# Patient Record
Sex: FEMALE | Race: WHITE | NOT HISPANIC OR LATINO | Employment: UNEMPLOYED | URBAN - METROPOLITAN AREA
[De-identification: names, ages, dates, MRNs, and addresses within clinical notes are randomized per-mention and may not be internally consistent; named-entity substitution may affect disease eponyms.]

---

## 2015-04-23 LAB — PAP SMEAR - HIM PATIENT REPORTED: NORMAL

## 2017-01-06 ENCOUNTER — OFFICE VISIT - HEALTHEAST (OUTPATIENT)
Dept: FAMILY MEDICINE | Facility: CLINIC | Age: 47
End: 2017-01-06

## 2017-01-06 ENCOUNTER — HOSPITAL ENCOUNTER (OUTPATIENT)
Dept: CT IMAGING | Facility: CLINIC | Age: 47
Discharge: HOME | End: 2017-01-06
Attending: NURSE PRACTITIONER

## 2017-01-06 ENCOUNTER — COMMUNICATION - HEALTHEAST (OUTPATIENT)
Dept: SCHEDULING | Facility: CLINIC | Age: 47
End: 2017-01-06

## 2017-01-06 DIAGNOSIS — R10.9 ABDOMINAL PAIN: ICD-10-CM

## 2017-01-14 ENCOUNTER — COMMUNICATION - HEALTHEAST (OUTPATIENT)
Dept: SCHEDULING | Facility: CLINIC | Age: 47
End: 2017-01-14

## 2017-01-14 ENCOUNTER — COMMUNICATION - HEALTHEAST (OUTPATIENT)
Dept: FAMILY MEDICINE | Facility: CLINIC | Age: 47
End: 2017-01-14

## 2017-01-16 ENCOUNTER — COMMUNICATION - HEALTHEAST (OUTPATIENT)
Dept: FAMILY MEDICINE | Facility: CLINIC | Age: 47
End: 2017-01-16

## 2017-01-18 ENCOUNTER — COMMUNICATION - HEALTHEAST (OUTPATIENT)
Dept: FAMILY MEDICINE | Facility: CLINIC | Age: 47
End: 2017-01-18

## 2017-01-31 ENCOUNTER — COMMUNICATION - HEALTHEAST (OUTPATIENT)
Dept: FAMILY MEDICINE | Facility: CLINIC | Age: 47
End: 2017-01-31

## 2017-02-06 ENCOUNTER — COMMUNICATION - HEALTHEAST (OUTPATIENT)
Dept: FAMILY MEDICINE | Facility: CLINIC | Age: 47
End: 2017-02-06

## 2017-02-27 ENCOUNTER — HOSPITAL ENCOUNTER (OUTPATIENT)
Dept: MAMMOGRAPHY | Facility: CLINIC | Age: 47
Discharge: HOME | End: 2017-02-27
Attending: OBSTETRICS & GYNECOLOGY

## 2017-02-27 ENCOUNTER — COMMUNICATION - HEALTHEAST (OUTPATIENT)
Dept: FAMILY MEDICINE | Facility: CLINIC | Age: 47
End: 2017-02-27

## 2017-02-27 DIAGNOSIS — Z12.31 VISIT FOR SCREENING MAMMOGRAM: ICD-10-CM

## 2017-02-28 ENCOUNTER — AMBULATORY - HEALTHEAST (OUTPATIENT)
Dept: FAMILY MEDICINE | Facility: CLINIC | Age: 47
End: 2017-02-28

## 2017-02-28 DIAGNOSIS — J32.9 SINUSITIS: ICD-10-CM

## 2017-02-28 DIAGNOSIS — J30.9 ALLERGIC RHINITIS: ICD-10-CM

## 2017-03-07 ENCOUNTER — COMMUNICATION - HEALTHEAST (OUTPATIENT)
Dept: ALLERGY | Facility: CLINIC | Age: 47
End: 2017-03-07

## 2017-03-07 ENCOUNTER — OFFICE VISIT - HEALTHEAST (OUTPATIENT)
Dept: ALLERGY | Facility: CLINIC | Age: 47
End: 2017-03-07

## 2017-03-07 DIAGNOSIS — R09.81 NASAL CONGESTION: ICD-10-CM

## 2017-03-07 DIAGNOSIS — R51.9 FACIAL PAIN: ICD-10-CM

## 2017-03-07 ASSESSMENT — MIFFLIN-ST. JEOR: SCORE: 1071.85

## 2017-03-09 ENCOUNTER — OFFICE VISIT - HEALTHEAST (OUTPATIENT)
Dept: FAMILY MEDICINE | Facility: CLINIC | Age: 47
End: 2017-03-09

## 2017-03-09 DIAGNOSIS — M79.601 ARM PAIN, RIGHT: ICD-10-CM

## 2017-03-10 ENCOUNTER — RECORDS - HEALTHEAST (OUTPATIENT)
Dept: ADMINISTRATIVE | Facility: OTHER | Age: 47
End: 2017-03-10

## 2017-03-10 ENCOUNTER — AMBULATORY - HEALTHEAST (OUTPATIENT)
Dept: ALLERGY | Facility: CLINIC | Age: 47
End: 2017-03-10

## 2017-03-10 DIAGNOSIS — R51.9 HEADACHE: ICD-10-CM

## 2017-03-30 ENCOUNTER — OFFICE VISIT - HEALTHEAST (OUTPATIENT)
Dept: OTOLARYNGOLOGY | Facility: CLINIC | Age: 47
End: 2017-03-30

## 2017-03-30 DIAGNOSIS — K21.9 LARYNGOPHARYNGEAL REFLUX (LPR): ICD-10-CM

## 2017-03-30 DIAGNOSIS — M26.659 TMJ ARTHROPATHY: ICD-10-CM

## 2017-03-30 DIAGNOSIS — R09.82 PND (POST-NASAL DRIP): ICD-10-CM

## 2017-03-30 ASSESSMENT — MIFFLIN-ST. JEOR: SCORE: 1071.85

## 2017-04-05 ENCOUNTER — RECORDS - HEALTHEAST (OUTPATIENT)
Dept: ADMINISTRATIVE | Facility: OTHER | Age: 47
End: 2017-04-05

## 2017-04-23 ENCOUNTER — COMMUNICATION - HEALTHEAST (OUTPATIENT)
Dept: FAMILY MEDICINE | Facility: CLINIC | Age: 47
End: 2017-04-23

## 2017-04-23 DIAGNOSIS — F41.9 ANXIETY: ICD-10-CM

## 2017-05-15 ENCOUNTER — RECORDS - HEALTHEAST (OUTPATIENT)
Dept: ADMINISTRATIVE | Facility: OTHER | Age: 47
End: 2017-05-15

## 2017-07-12 ENCOUNTER — RECORDS - HEALTHEAST (OUTPATIENT)
Dept: GENERAL RADIOLOGY | Facility: CLINIC | Age: 47
End: 2017-07-12

## 2017-07-12 ENCOUNTER — OFFICE VISIT - HEALTHEAST (OUTPATIENT)
Dept: FAMILY MEDICINE | Facility: CLINIC | Age: 47
End: 2017-07-12

## 2017-07-12 DIAGNOSIS — M19.90 DJD (DEGENERATIVE JOINT DISEASE): ICD-10-CM

## 2017-07-12 DIAGNOSIS — M25.512 LEFT SHOULDER PAIN: ICD-10-CM

## 2017-07-12 DIAGNOSIS — M79.676 PAIN IN UNSPECIFIED TOE(S): ICD-10-CM

## 2017-07-12 DIAGNOSIS — K59.00 CONSTIPATION: ICD-10-CM

## 2017-07-26 ENCOUNTER — RECORDS - HEALTHEAST (OUTPATIENT)
Dept: ADMINISTRATIVE | Facility: OTHER | Age: 47
End: 2017-07-26

## 2017-08-30 ENCOUNTER — RECORDS - HEALTHEAST (OUTPATIENT)
Dept: ADMINISTRATIVE | Facility: OTHER | Age: 47
End: 2017-08-30

## 2017-10-04 ENCOUNTER — RECORDS - HEALTHEAST (OUTPATIENT)
Dept: ADMINISTRATIVE | Facility: OTHER | Age: 47
End: 2017-10-04

## 2017-10-06 ENCOUNTER — COMMUNICATION - HEALTHEAST (OUTPATIENT)
Dept: FAMILY MEDICINE | Facility: CLINIC | Age: 47
End: 2017-10-06

## 2017-11-30 ENCOUNTER — COMMUNICATION - HEALTHEAST (OUTPATIENT)
Dept: FAMILY MEDICINE | Facility: CLINIC | Age: 47
End: 2017-11-30

## 2017-11-30 DIAGNOSIS — M26.629 TMJ ARTHRALGIA: ICD-10-CM

## 2017-12-07 ENCOUNTER — COMMUNICATION - HEALTHEAST (OUTPATIENT)
Dept: FAMILY MEDICINE | Facility: CLINIC | Age: 47
End: 2017-12-07

## 2017-12-12 ENCOUNTER — AMBULATORY - HEALTHEAST (OUTPATIENT)
Dept: NURSING | Facility: CLINIC | Age: 47
End: 2017-12-12

## 2017-12-12 DIAGNOSIS — Z23 NEED FOR VACCINATION: ICD-10-CM

## 2018-01-23 ENCOUNTER — COMMUNICATION - HEALTHEAST (OUTPATIENT)
Dept: FAMILY MEDICINE | Facility: CLINIC | Age: 48
End: 2018-01-23

## 2018-01-23 DIAGNOSIS — F41.9 ANXIETY: ICD-10-CM

## 2018-03-14 ENCOUNTER — OFFICE VISIT - HEALTHEAST (OUTPATIENT)
Dept: FAMILY MEDICINE | Facility: CLINIC | Age: 48
End: 2018-03-14

## 2018-03-14 DIAGNOSIS — F41.9 ANXIETY: ICD-10-CM

## 2018-03-14 DIAGNOSIS — R53.83 FATIGUE: ICD-10-CM

## 2018-03-14 DIAGNOSIS — D64.9 ANEMIA: ICD-10-CM

## 2018-03-14 LAB
ERYTHROCYTE [DISTWIDTH] IN BLOOD BY AUTOMATED COUNT: 11.9 % (ref 11–14.5)
HCT VFR BLD AUTO: 35.4 % (ref 35–47)
HGB BLD-MCNC: 11.6 G/DL (ref 12–16)
MCH RBC QN AUTO: 27.8 PG (ref 27–34)
MCHC RBC AUTO-ENTMCNC: 32.7 G/DL (ref 32–36)
MCV RBC AUTO: 85 FL (ref 80–100)
PLATELET # BLD AUTO: 193 THOU/UL (ref 140–440)
PMV BLD AUTO: 7.3 FL (ref 7–10)
RBC # BLD AUTO: 4.15 MILL/UL (ref 3.8–5.4)
TSH SERPL DL<=0.005 MIU/L-ACNC: 0.91 UIU/ML (ref 0.3–5)
WBC: 4.7 THOU/UL (ref 4–11)

## 2018-03-14 ASSESSMENT — MIFFLIN-ST. JEOR: SCORE: 1063.68

## 2018-03-15 ENCOUNTER — COMMUNICATION - HEALTHEAST (OUTPATIENT)
Dept: FAMILY MEDICINE | Facility: CLINIC | Age: 48
End: 2018-03-15

## 2018-03-15 LAB
FERRITIN SERPL-MCNC: 7 NG/ML (ref 10–130)
IRON SATN MFR SERPL: 21 % (ref 20–50)
IRON SERPL-MCNC: 73 UG/DL (ref 42–175)
TIBC SERPL-MCNC: 340 UG/DL (ref 313–563)
TRANSFERRIN SERPL-MCNC: 272 MG/DL (ref 212–360)

## 2018-03-21 ENCOUNTER — HOSPITAL ENCOUNTER (OUTPATIENT)
Dept: MAMMOGRAPHY | Facility: CLINIC | Age: 48
Discharge: HOME | End: 2018-03-21
Attending: FAMILY MEDICINE

## 2018-03-21 DIAGNOSIS — Z12.31 VISIT FOR SCREENING MAMMOGRAM: ICD-10-CM

## 2018-04-23 ENCOUNTER — COMMUNICATION - HEALTHEAST (OUTPATIENT)
Dept: FAMILY MEDICINE | Facility: CLINIC | Age: 48
End: 2018-04-23

## 2018-04-27 ENCOUNTER — OFFICE VISIT - HEALTHEAST (OUTPATIENT)
Dept: FAMILY MEDICINE | Facility: CLINIC | Age: 48
End: 2018-04-27

## 2018-04-27 DIAGNOSIS — D50.9 IRON DEFICIENCY ANEMIA: ICD-10-CM

## 2018-04-27 DIAGNOSIS — Z01.818 ENCOUNTER FOR PREOPERATIVE EXAMINATION FOR GENERAL SURGICAL PROCEDURE: ICD-10-CM

## 2018-04-27 LAB
BASOPHILS # BLD AUTO: 0 THOU/UL (ref 0–0.2)
BASOPHILS NFR BLD AUTO: 0 % (ref 0–2)
EOSINOPHIL # BLD AUTO: 0.1 THOU/UL (ref 0–0.4)
EOSINOPHIL NFR BLD AUTO: 2 % (ref 0–6)
ERYTHROCYTE [DISTWIDTH] IN BLOOD BY AUTOMATED COUNT: 13 % (ref 11–14.5)
FERRITIN SERPL-MCNC: 10 NG/ML (ref 10–130)
HCT VFR BLD AUTO: 38.4 % (ref 35–47)
HGB BLD-MCNC: 12.9 G/DL (ref 12–16)
LYMPHOCYTES # BLD AUTO: 0.9 THOU/UL (ref 0.8–4.4)
LYMPHOCYTES NFR BLD AUTO: 23 % (ref 20–40)
MCH RBC QN AUTO: 29 PG (ref 27–34)
MCHC RBC AUTO-ENTMCNC: 33.7 G/DL (ref 32–36)
MCV RBC AUTO: 86 FL (ref 80–100)
MONOCYTES # BLD AUTO: 0.3 THOU/UL (ref 0–0.9)
MONOCYTES NFR BLD AUTO: 6 % (ref 2–10)
NEUTROPHILS # BLD AUTO: 2.7 THOU/UL (ref 2–7.7)
NEUTROPHILS NFR BLD AUTO: 68 % (ref 50–70)
PLATELET # BLD AUTO: 191 THOU/UL (ref 140–440)
PMV BLD AUTO: 7.8 FL (ref 7–10)
RBC # BLD AUTO: 4.46 MILL/UL (ref 3.8–5.4)
WBC: 4 THOU/UL (ref 4–11)

## 2018-04-27 ASSESSMENT — MIFFLIN-ST. JEOR: SCORE: 1058.24

## 2018-05-05 ENCOUNTER — COMMUNICATION - HEALTHEAST (OUTPATIENT)
Dept: SCHEDULING | Facility: CLINIC | Age: 48
End: 2018-05-05

## 2018-05-07 ENCOUNTER — COMMUNICATION - HEALTHEAST (OUTPATIENT)
Dept: FAMILY MEDICINE | Facility: CLINIC | Age: 48
End: 2018-05-07

## 2018-06-29 ENCOUNTER — COMMUNICATION - HEALTHEAST (OUTPATIENT)
Dept: FAMILY MEDICINE | Facility: CLINIC | Age: 48
End: 2018-06-29

## 2018-08-31 ENCOUNTER — COMMUNICATION - HEALTHEAST (OUTPATIENT)
Dept: FAMILY MEDICINE | Facility: CLINIC | Age: 48
End: 2018-08-31

## 2018-08-31 DIAGNOSIS — Z71.85 VACCINE COUNSELING: ICD-10-CM

## 2018-08-31 DIAGNOSIS — D50.9 ANEMIA, IRON DEFICIENCY: ICD-10-CM

## 2018-09-10 ENCOUNTER — COMMUNICATION - HEALTHEAST (OUTPATIENT)
Dept: FAMILY MEDICINE | Facility: CLINIC | Age: 48
End: 2018-09-10

## 2018-09-12 ENCOUNTER — COMMUNICATION - HEALTHEAST (OUTPATIENT)
Dept: FAMILY MEDICINE | Facility: CLINIC | Age: 48
End: 2018-09-12

## 2018-09-12 DIAGNOSIS — R30.0 DYSURIA: ICD-10-CM

## 2018-09-13 ENCOUNTER — AMBULATORY - HEALTHEAST (OUTPATIENT)
Dept: LAB | Facility: CLINIC | Age: 48
End: 2018-09-13

## 2018-09-13 DIAGNOSIS — R30.0 DYSURIA: ICD-10-CM

## 2018-09-13 LAB
ALBUMIN UR-MCNC: NEGATIVE MG/DL
APPEARANCE UR: CLEAR
BILIRUB UR QL STRIP: NEGATIVE
COLOR UR AUTO: YELLOW
GLUCOSE UR STRIP-MCNC: NEGATIVE MG/DL
HGB UR QL STRIP: NEGATIVE
KETONES UR STRIP-MCNC: NEGATIVE MG/DL
LEUKOCYTE ESTERASE UR QL STRIP: NEGATIVE
NITRATE UR QL: NEGATIVE
PH UR STRIP: 6.5 [PH] (ref 5–8)
SP GR UR STRIP: <=1.005 (ref 1–1.03)
UROBILINOGEN UR STRIP-ACNC: NORMAL

## 2018-09-17 ENCOUNTER — AMBULATORY - HEALTHEAST (OUTPATIENT)
Dept: LAB | Facility: CLINIC | Age: 48
End: 2018-09-17

## 2018-09-17 ENCOUNTER — AMBULATORY - HEALTHEAST (OUTPATIENT)
Dept: NURSING | Facility: CLINIC | Age: 48
End: 2018-09-17

## 2018-09-17 DIAGNOSIS — D50.9 ANEMIA, IRON DEFICIENCY: ICD-10-CM

## 2018-09-17 DIAGNOSIS — Z71.85 VACCINE COUNSELING: ICD-10-CM

## 2018-09-17 LAB
ERYTHROCYTE [DISTWIDTH] IN BLOOD BY AUTOMATED COUNT: 11.1 % (ref 11–14.5)
FERRITIN SERPL-MCNC: 16 NG/ML (ref 10–130)
HCT VFR BLD AUTO: 37.3 % (ref 35–47)
HGB BLD-MCNC: 12.8 G/DL (ref 12–16)
MCH RBC QN AUTO: 29.2 PG (ref 27–34)
MCHC RBC AUTO-ENTMCNC: 34.2 G/DL (ref 32–36)
MCV RBC AUTO: 85 FL (ref 80–100)
PLATELET # BLD AUTO: 209 THOU/UL (ref 140–440)
PMV BLD AUTO: 7.8 FL (ref 7–10)
RBC # BLD AUTO: 4.37 MILL/UL (ref 3.8–5.4)
WBC: 3.6 THOU/UL (ref 4–11)

## 2018-09-18 ENCOUNTER — COMMUNICATION - HEALTHEAST (OUTPATIENT)
Dept: FAMILY MEDICINE | Facility: CLINIC | Age: 48
End: 2018-09-18

## 2018-09-18 LAB — HBV SURFACE AB SERPL IA-ACNC: POSITIVE M[IU]/ML

## 2018-09-24 ENCOUNTER — COMMUNICATION - HEALTHEAST (OUTPATIENT)
Dept: FAMILY MEDICINE | Facility: CLINIC | Age: 48
End: 2018-09-24

## 2018-09-25 ENCOUNTER — OFFICE VISIT - HEALTHEAST (OUTPATIENT)
Dept: FAMILY MEDICINE | Facility: CLINIC | Age: 48
End: 2018-09-25

## 2018-09-25 DIAGNOSIS — F41.1 GENERALIZED ANXIETY DISORDER: ICD-10-CM

## 2018-09-25 DIAGNOSIS — N89.8 VAGINAL IRRITATION: ICD-10-CM

## 2018-09-25 DIAGNOSIS — Z79.899 MEDICATION MANAGEMENT: ICD-10-CM

## 2018-09-25 LAB
ATRIAL RATE - MUSE: 86 BPM
CLUE CELLS: NORMAL
DIASTOLIC BLOOD PRESSURE - MUSE: NORMAL MMHG
INTERPRETATION ECG - MUSE: NORMAL
P AXIS - MUSE: 47 DEGREES
PR INTERVAL - MUSE: 140 MS
QRS DURATION - MUSE: 76 MS
QT - MUSE: 360 MS
QTC - MUSE: 430 MS
R AXIS - MUSE: 21 DEGREES
SYSTOLIC BLOOD PRESSURE - MUSE: NORMAL MMHG
T AXIS - MUSE: 37 DEGREES
TRICHOMONAS, WET PREP: NORMAL
VENTRICULAR RATE- MUSE: 86 BPM
YEAST, WET PREP: NORMAL

## 2018-10-12 ENCOUNTER — COMMUNICATION - HEALTHEAST (OUTPATIENT)
Dept: FAMILY MEDICINE | Facility: CLINIC | Age: 48
End: 2018-10-12

## 2018-10-22 ENCOUNTER — OFFICE VISIT - HEALTHEAST (OUTPATIENT)
Dept: FAMILY MEDICINE | Facility: CLINIC | Age: 48
End: 2018-10-22

## 2018-10-22 DIAGNOSIS — G47.00 INSOMNIA: ICD-10-CM

## 2018-10-22 DIAGNOSIS — F41.1 GENERALIZED ANXIETY DISORDER: ICD-10-CM

## 2018-10-22 DIAGNOSIS — Z79.899 MEDICATION MANAGEMENT: ICD-10-CM

## 2018-10-22 LAB — TSH SERPL DL<=0.005 MIU/L-ACNC: 0.91 UIU/ML (ref 0.3–5)

## 2018-11-03 ENCOUNTER — COMMUNICATION - HEALTHEAST (OUTPATIENT)
Dept: FAMILY MEDICINE | Facility: CLINIC | Age: 48
End: 2018-11-03

## 2018-11-03 DIAGNOSIS — F41.1 GENERALIZED ANXIETY DISORDER: ICD-10-CM

## 2018-11-19 ENCOUNTER — RECORDS - HEALTHEAST (OUTPATIENT)
Dept: ADMINISTRATIVE | Facility: OTHER | Age: 48
End: 2018-11-19

## 2018-12-09 ENCOUNTER — COMMUNICATION - HEALTHEAST (OUTPATIENT)
Dept: FAMILY MEDICINE | Facility: CLINIC | Age: 48
End: 2018-12-09

## 2018-12-27 ENCOUNTER — TRANSFERRED RECORDS (OUTPATIENT)
Dept: HEALTH INFORMATION MANAGEMENT | Facility: CLINIC | Age: 48
End: 2018-12-27

## 2018-12-27 ENCOUNTER — RECORDS - HEALTHEAST (OUTPATIENT)
Dept: ADMINISTRATIVE | Facility: OTHER | Age: 48
End: 2018-12-27

## 2019-01-28 ENCOUNTER — COMMUNICATION - HEALTHEAST (OUTPATIENT)
Dept: FAMILY MEDICINE | Facility: CLINIC | Age: 49
End: 2019-01-28

## 2019-01-28 DIAGNOSIS — M19.049 ARTHRITIS OF HAND: ICD-10-CM

## 2019-01-28 DIAGNOSIS — D72.819 LEUKOPENIA, UNSPECIFIED TYPE: ICD-10-CM

## 2019-02-18 ENCOUNTER — RECORDS - HEALTHEAST (OUTPATIENT)
Dept: ADMINISTRATIVE | Facility: OTHER | Age: 49
End: 2019-02-18

## 2019-02-18 ENCOUNTER — TRANSFERRED RECORDS (OUTPATIENT)
Dept: HEALTH INFORMATION MANAGEMENT | Facility: CLINIC | Age: 49
End: 2019-02-18

## 2019-02-22 ENCOUNTER — RECORDS - HEALTHEAST (OUTPATIENT)
Dept: ADMINISTRATIVE | Facility: OTHER | Age: 49
End: 2019-02-22

## 2019-02-22 LAB — HPV_EXT - HISTORICAL: ABNORMAL

## 2019-03-20 ENCOUNTER — RECORDS - HEALTHEAST (OUTPATIENT)
Dept: ADMINISTRATIVE | Facility: OTHER | Age: 49
End: 2019-03-20

## 2019-04-08 ENCOUNTER — OFFICE VISIT - HEALTHEAST (OUTPATIENT)
Dept: FAMILY MEDICINE | Facility: CLINIC | Age: 49
End: 2019-04-08

## 2019-04-08 DIAGNOSIS — H93.13 TINNITUS OF BOTH EARS: ICD-10-CM

## 2019-04-08 ASSESSMENT — MIFFLIN-ST. JEOR: SCORE: 990.76

## 2019-04-08 NOTE — ASSESSMENT & PLAN NOTE
Diagnosis.  Unclear etiology.  Broad differential was considered.  There is no pulsatile quality.  No other neurologic symptoms to suggest central lesion.  She does have symptoms consistent with eustachian tube dysfunction.  She also has a history of TMJ.  She is also been using NSAIDs intermittently.  We discussed each of these conditions.  Given her occupation as a  however, I suspect that her tinnitus is the result of sensorineural hearing loss.  She will work to minimize the effect of the above conditions.  I referred her to audiology and will consider a referral to otolaryngology in the future if her symptoms continue.

## 2019-04-23 ENCOUNTER — RECORDS - HEALTHEAST (OUTPATIENT)
Dept: ADMINISTRATIVE | Facility: OTHER | Age: 49
End: 2019-04-23

## 2019-04-26 ENCOUNTER — HOSPITAL ENCOUNTER (OUTPATIENT)
Dept: MAMMOGRAPHY | Facility: CLINIC | Age: 49
Discharge: HOME | End: 2019-04-26
Attending: OBSTETRICS & GYNECOLOGY

## 2019-04-26 DIAGNOSIS — Z12.31 VISIT FOR SCREENING MAMMOGRAM: ICD-10-CM

## 2019-05-01 ENCOUNTER — OFFICE VISIT - HEALTHEAST (OUTPATIENT)
Dept: AUDIOLOGY | Facility: CLINIC | Age: 49
End: 2019-05-01

## 2019-05-01 ENCOUNTER — OFFICE VISIT - HEALTHEAST (OUTPATIENT)
Dept: OTOLARYNGOLOGY | Facility: CLINIC | Age: 49
End: 2019-05-01

## 2019-05-01 DIAGNOSIS — H93.13 TINNITUS OF BOTH EARS: ICD-10-CM

## 2019-05-01 DIAGNOSIS — H93.8X3 PLUGGED FEELING IN EAR, BILATERAL: ICD-10-CM

## 2019-05-03 ENCOUNTER — TRANSFERRED RECORDS (OUTPATIENT)
Dept: HEALTH INFORMATION MANAGEMENT | Facility: CLINIC | Age: 49
End: 2019-05-03

## 2019-05-14 ENCOUNTER — COMMUNICATION - HEALTHEAST (OUTPATIENT)
Dept: FAMILY MEDICINE | Facility: CLINIC | Age: 49
End: 2019-05-14

## 2019-05-16 ENCOUNTER — COMMUNICATION - HEALTHEAST (OUTPATIENT)
Dept: FAMILY MEDICINE | Facility: CLINIC | Age: 49
End: 2019-05-16

## 2019-05-17 ENCOUNTER — OFFICE VISIT - HEALTHEAST (OUTPATIENT)
Dept: FAMILY MEDICINE | Facility: CLINIC | Age: 49
End: 2019-05-17

## 2019-05-17 DIAGNOSIS — R20.8 DYSESTHESIA: ICD-10-CM

## 2019-05-17 ASSESSMENT — MIFFLIN-ST. JEOR: SCORE: 990.76

## 2019-07-30 ENCOUNTER — RECORDS - HEALTHEAST (OUTPATIENT)
Dept: ADMINISTRATIVE | Facility: OTHER | Age: 49
End: 2019-07-30

## 2019-07-30 ENCOUNTER — TRANSFERRED RECORDS (OUTPATIENT)
Dept: HEALTH INFORMATION MANAGEMENT | Facility: CLINIC | Age: 49
End: 2019-07-30

## 2019-08-13 ENCOUNTER — TRANSFERRED RECORDS (OUTPATIENT)
Dept: HEALTH INFORMATION MANAGEMENT | Facility: CLINIC | Age: 49
End: 2019-08-13

## 2019-08-13 ENCOUNTER — RECORDS - HEALTHEAST (OUTPATIENT)
Dept: ADMINISTRATIVE | Facility: OTHER | Age: 49
End: 2019-08-13

## 2019-09-09 ENCOUNTER — RECORDS - HEALTHEAST (OUTPATIENT)
Dept: ADMINISTRATIVE | Facility: OTHER | Age: 49
End: 2019-09-09

## 2019-09-09 LAB — HPV_EXT - HISTORICAL: ABNORMAL

## 2019-10-02 ENCOUNTER — COMMUNICATION - HEALTHEAST (OUTPATIENT)
Dept: FAMILY MEDICINE | Facility: CLINIC | Age: 49
End: 2019-10-02

## 2019-10-09 ENCOUNTER — TRANSFERRED RECORDS (OUTPATIENT)
Dept: HEALTH INFORMATION MANAGEMENT | Facility: CLINIC | Age: 49
End: 2019-10-09

## 2019-10-09 ENCOUNTER — OFFICE VISIT - HEALTHEAST (OUTPATIENT)
Dept: FAMILY MEDICINE | Facility: CLINIC | Age: 49
End: 2019-10-09

## 2019-10-09 DIAGNOSIS — F41.1 GENERALIZED ANXIETY DISORDER: ICD-10-CM

## 2019-10-09 DIAGNOSIS — K59.01 SLOW TRANSIT CONSTIPATION: ICD-10-CM

## 2019-10-09 DIAGNOSIS — R14.0 BLOATING: ICD-10-CM

## 2019-10-09 DIAGNOSIS — Z00.00 ROUTINE GENERAL MEDICAL EXAMINATION AT A HEALTH CARE FACILITY: ICD-10-CM

## 2019-10-09 ASSESSMENT — ANXIETY QUESTIONNAIRES
5. BEING SO RESTLESS THAT IT IS HARD TO SIT STILL: SEVERAL DAYS
4. TROUBLE RELAXING: SEVERAL DAYS
GAD7 TOTAL SCORE: 5
7. FEELING AFRAID AS IF SOMETHING AWFUL MIGHT HAPPEN: NOT AT ALL
3. WORRYING TOO MUCH ABOUT DIFFERENT THINGS: SEVERAL DAYS
6. BECOMING EASILY ANNOYED OR IRRITABLE: SEVERAL DAYS
IF YOU CHECKED OFF ANY PROBLEMS ON THIS QUESTIONNAIRE, HOW DIFFICULT HAVE THESE PROBLEMS MADE IT FOR YOU TO DO YOUR WORK, TAKE CARE OF THINGS AT HOME, OR GET ALONG WITH OTHER PEOPLE: SOMEWHAT DIFFICULT
2. NOT BEING ABLE TO STOP OR CONTROL WORRYING: NOT AT ALL
1. FEELING NERVOUS, ANXIOUS, OR ON EDGE: SEVERAL DAYS

## 2019-10-09 ASSESSMENT — MIFFLIN-ST. JEOR: SCORE: 988.78

## 2019-10-10 LAB
CHOLEST SERPL-MCNC: 155 MG/DL
FASTING STATUS PATIENT QL REPORTED: NORMAL
HDLC SERPL-MCNC: 88 MG/DL
LDLC SERPL CALC-MCNC: 59 MG/DL
TRIGL SERPL-MCNC: 39 MG/DL
TSH SERPL DL<=0.005 MIU/L-ACNC: 1.06 UIU/ML (ref 0.3–5)

## 2019-10-14 ENCOUNTER — TRANSFERRED RECORDS (OUTPATIENT)
Dept: HEALTH INFORMATION MANAGEMENT | Facility: CLINIC | Age: 49
End: 2019-10-14

## 2019-10-14 ENCOUNTER — MEDICAL CORRESPONDENCE (OUTPATIENT)
Dept: HEALTH INFORMATION MANAGEMENT | Facility: CLINIC | Age: 49
End: 2019-10-14

## 2019-10-17 ENCOUNTER — RECORDS - HEALTHEAST (OUTPATIENT)
Dept: HEALTH INFORMATION MANAGEMENT | Facility: CLINIC | Age: 49
End: 2019-10-17

## 2019-10-21 ENCOUNTER — COMMUNICATION - HEALTHEAST (OUTPATIENT)
Dept: FAMILY MEDICINE | Facility: CLINIC | Age: 49
End: 2019-10-21

## 2019-10-21 ENCOUNTER — RECORDS - HEALTHEAST (OUTPATIENT)
Dept: HEALTH INFORMATION MANAGEMENT | Facility: CLINIC | Age: 49
End: 2019-10-21

## 2019-10-21 DIAGNOSIS — F41.1 GENERALIZED ANXIETY DISORDER: ICD-10-CM

## 2019-10-22 ENCOUNTER — COMMUNICATION - HEALTHEAST (OUTPATIENT)
Dept: FAMILY MEDICINE | Facility: CLINIC | Age: 49
End: 2019-10-22

## 2019-10-23 NOTE — TELEPHONE ENCOUNTER
RECORDS RECEIVED FROM: [facility]    DATE RECEIVED: 2/3/20    NOTES STATUS DETAILS   OFFICE NOTE from referring provider Received HE recs scanned in [facility]    OFFICE NOTE from other specialist Received HE recs scanned in epic   [facility] recs scanned in epic   Fax to [facility] to obtain outside recs - 10/23    DISCHARGE SUMMARY from hospital N/A    OPERATIVE REPORT N/A    MEDICATION LIST N/A         ENDOSCOPY  N/A    COLONOSCOPY Care Everywhere 2/18/19   ERCP N/A    EUS N/A    STOOL TESTING N/A    PERTINENT LABS Internal    PATHOLOGY REPORTS (RELATED) N/A    IMAGING (CT, MRI, EGD) Received  Images in  PACS  XR Abd 5/3/19, 5/1/19      Fax to  to push images - 10/23      REFERRAL INFORMATION    Date referral was placed: 2/3/20   Date all records received: N/A   Date records were scanned into Epic: N/A   Date records were sent to Provider to review: N/A   Date and recommendation received from provider:  LETTER SENT  SCHEDULE APPOINTMENT   Date patient was contacted to schedule: 10/18/19

## 2019-11-04 ENCOUNTER — COMMUNICATION - HEALTHEAST (OUTPATIENT)
Dept: FAMILY MEDICINE | Facility: CLINIC | Age: 49
End: 2019-11-04

## 2019-11-04 DIAGNOSIS — F41.1 GENERALIZED ANXIETY DISORDER: ICD-10-CM

## 2019-11-22 ENCOUNTER — COMMUNICATION - HEALTHEAST (OUTPATIENT)
Dept: FAMILY MEDICINE | Facility: CLINIC | Age: 49
End: 2019-11-22

## 2019-12-09 ENCOUNTER — COMMUNICATION - HEALTHEAST (OUTPATIENT)
Dept: FAMILY MEDICINE | Facility: CLINIC | Age: 49
End: 2019-12-09

## 2019-12-09 DIAGNOSIS — F41.1 GENERALIZED ANXIETY DISORDER: ICD-10-CM

## 2019-12-12 ENCOUNTER — RECORDS - HEALTHEAST (OUTPATIENT)
Dept: GENERAL RADIOLOGY | Facility: CLINIC | Age: 49
End: 2019-12-12

## 2019-12-12 ENCOUNTER — OFFICE VISIT - HEALTHEAST (OUTPATIENT)
Dept: FAMILY MEDICINE | Facility: CLINIC | Age: 49
End: 2019-12-12

## 2019-12-12 ENCOUNTER — COMMUNICATION - HEALTHEAST (OUTPATIENT)
Dept: FAMILY MEDICINE | Facility: CLINIC | Age: 49
End: 2019-12-12

## 2019-12-12 DIAGNOSIS — M53.3 PAIN IN THE COCCYX: ICD-10-CM

## 2019-12-12 DIAGNOSIS — F41.1 GENERALIZED ANXIETY DISORDER: ICD-10-CM

## 2019-12-12 DIAGNOSIS — J01.10 ACUTE NON-RECURRENT FRONTAL SINUSITIS: ICD-10-CM

## 2019-12-12 DIAGNOSIS — M53.3 SACROCOCCYGEAL DISORDERS, NOT ELSEWHERE CLASSIFIED: ICD-10-CM

## 2019-12-12 ASSESSMENT — ANXIETY QUESTIONNAIRES
7. FEELING AFRAID AS IF SOMETHING AWFUL MIGHT HAPPEN: NOT AT ALL
IF YOU CHECKED OFF ANY PROBLEMS ON THIS QUESTIONNAIRE, HOW DIFFICULT HAVE THESE PROBLEMS MADE IT FOR YOU TO DO YOUR WORK, TAKE CARE OF THINGS AT HOME, OR GET ALONG WITH OTHER PEOPLE: NOT DIFFICULT AT ALL
2. NOT BEING ABLE TO STOP OR CONTROL WORRYING: NOT AT ALL
5. BEING SO RESTLESS THAT IT IS HARD TO SIT STILL: NOT AT ALL
GAD7 TOTAL SCORE: 1
3. WORRYING TOO MUCH ABOUT DIFFERENT THINGS: NOT AT ALL
1. FEELING NERVOUS, ANXIOUS, OR ON EDGE: NOT AT ALL
4. TROUBLE RELAXING: NOT AT ALL
6. BECOMING EASILY ANNOYED OR IRRITABLE: SEVERAL DAYS

## 2020-01-30 ENCOUNTER — TELEPHONE (OUTPATIENT)
Dept: GASTROENTEROLOGY | Facility: CLINIC | Age: 50
End: 2020-01-30

## 2020-01-30 NOTE — TELEPHONE ENCOUNTER
Called and left message for patient reminding of appointment scheduled on 2/3/20 at 220pm with [facility] GI clinic. Patient to arrive 15 min early. To reschedule or cancel patient to call [phone].    GABY Dobbs

## 2020-02-03 ENCOUNTER — RECORDS - HEALTHEAST (OUTPATIENT)
Dept: ADMINISTRATIVE | Facility: OTHER | Age: 50
End: 2020-02-03

## 2020-02-03 ENCOUNTER — PRE VISIT (OUTPATIENT)
Dept: GASTROENTEROLOGY | Facility: CLINIC | Age: 50
End: 2020-02-03

## 2020-02-03 ENCOUNTER — OFFICE VISIT (OUTPATIENT)
Dept: GASTROENTEROLOGY | Facility: CLINIC | Age: 50
End: 2020-02-03
Payer: COMMERCIAL

## 2020-02-03 VITALS
BODY MASS INDEX: 20.62 KG/M2 | SYSTOLIC BLOOD PRESSURE: 136 MMHG | HEART RATE: 99 BPM | HEIGHT: 60 IN | WEIGHT: 105 LBS | OXYGEN SATURATION: 99 % | DIASTOLIC BLOOD PRESSURE: 76 MMHG

## 2020-02-03 DIAGNOSIS — K59.01 SLOW TRANSIT CONSTIPATION: Primary | ICD-10-CM

## 2020-02-03 RX ORDER — TRAZODONE HYDROCHLORIDE 50 MG/1
TABLET, FILM COATED ORAL
COMMUNITY
Start: 2020-02-03 | End: 2021-07-15

## 2020-02-03 RX ORDER — CITALOPRAM HYDROBROMIDE 40 MG/1
TABLET ORAL
COMMUNITY
Start: 2019-09-23 | End: 2020-02-03

## 2020-02-03 RX ORDER — PRUCALOPRIDE 1 MG/1
TABLET, FILM COATED ORAL
COMMUNITY
Start: 2019-09-30 | End: 2020-02-03

## 2020-02-03 RX ORDER — BIOTIN 5 MG
TABLET ORAL
COMMUNITY
Start: 2019-06-03

## 2020-02-03 RX ORDER — MUPIROCIN 20 MG/G
OINTMENT TOPICAL
COMMUNITY
Start: 2019-05-06 | End: 2020-02-03

## 2020-02-03 RX ORDER — PLECANATIDE 3 MG/1
TABLET ORAL
COMMUNITY
Start: 2019-09-23 | End: 2020-02-03

## 2020-02-03 RX ORDER — SENNOSIDES 8.6 MG
1 TABLET ORAL DAILY
Qty: 90 TABLET | Refills: 3 | Status: SHIPPED | OUTPATIENT
Start: 2020-02-03 | End: 2021-07-29

## 2020-02-03 RX ORDER — LINACLOTIDE 72 UG/1
CAPSULE, GELATIN COATED ORAL
COMMUNITY
Start: 2019-04-14 | End: 2020-02-03

## 2020-02-03 RX ORDER — POLYETHYLENE GLYCOL 3350 17 G/17G
1 POWDER, FOR SOLUTION ORAL DAILY
Qty: 507 G | Refills: 11 | Status: SHIPPED | OUTPATIENT
Start: 2020-02-03 | End: 2022-10-12

## 2020-02-03 RX ORDER — POLYETHYLENE GLYCOL 3350 17 G/17G
238 POWDER, FOR SOLUTION ORAL ONCE
Qty: 238 G | Refills: 1 | Status: SHIPPED | OUTPATIENT
Start: 2020-02-03 | End: 2020-02-03

## 2020-02-03 RX ORDER — VENLAFAXINE HYDROCHLORIDE 75 MG/1
CAPSULE, EXTENDED RELEASE ORAL
COMMUNITY
Start: 2020-01-08 | End: 2022-02-07

## 2020-02-03 ASSESSMENT — ENCOUNTER SYMPTOMS
HEARTBURN: 0
BACK PAIN: 0
DIARRHEA: 0
BLOATING: 1
JOINT SWELLING: 1
BRUISES/BLEEDS EASILY: 1
NECK PAIN: 0
CONSTIPATION: 1
ABDOMINAL PAIN: 0
ARTHRALGIAS: 1
MYALGIAS: 0
VOMITING: 0
NAUSEA: 0
MUSCLE CRAMPS: 0
SWOLLEN GLANDS: 0

## 2020-02-03 ASSESSMENT — MIFFLIN-ST. JEOR: SCORE: 1017.78

## 2020-02-03 ASSESSMENT — PAIN SCALES - GENERAL: PAINLEVEL: MILD PAIN (2)

## 2020-02-03 NOTE — PROGRESS NOTES
GI CLINIC VISIT    CC/REFERRING MD:  Surekha Contreras    REASON FOR CONSULTATION:   The pt is a 49 year old female who I was asked to see in consultation at the request of Dr. Surekha Contreras for constipation.    ASSESSMENT/PLAN:  49-year-old woman with depression and slow transit constipation who presents for management of constipation.    Work-up thus far reassuringly normal and suggestive of slow transit constipation.  Sitzmarks are demonstrated retained markers at day 5 (although mostly in distal colon).  Pelvic floor evaluation was essentially unremarkable.  Electrolytes previously normal and symptoms have not changed since prior lab evaluation.    Reassuringly, she had too strong of the response to laxatives/secretagogues and these were needed to be stopped due to side effects.  She will likely benefit from a more moderate approach to constipation, although it may take some tailoring.    -MiraLAX colonoscopy prep to clean out  -Prunes for breakfast  -MiraLAX 17 g daily  -Senna once per day  -Magnesium oxide twice per day  -If this causes too much diarrhea, first decrease magnesium oxide, then decrease senna  -If still constipated, may be beneficial to start Linzess 72mcg QOD   -May be beneficial to try squatty potty, although again pelvic floor evaluation was normal    RTC PRN-she will call if additional appointment is desired.  Okay to see myself or OLEG Mcrae.    Thank you for this consultation.  It was a pleasure to participate in the care of this patient; please contact us with any further questions.      Elmer Douglass MD    Division of Gastroenterology, Hepatology and Nutrition  [facility]      HPI  49-year-old woman with depression and slow transit constipation who presents for management of constipation.    She ties the onset of constipation to surgery in 2006 for umbilical hernia and torn rectus muscle.  Since then, she has had a bowel movement  approximately once per week.  She needs to strain to defecate and will often sit on the toilet for 15 minutes.  However, the stool is usually soft.  She will have some nausea and cramping when defecating.  She notes bloating and gas from many different kinds of food.  She feels like she completely empties her rectum.  No blood or melena.  Lost 15 pounds on purpose 1.5 years ago, since then weights been stable.  No family history of colorectal cancer.  Currently, she takes senna once per day and massages her abdomen in the morning-this is approximately the best she is felt and is having a bowel movement once every 5 days.    She is tried a variety of different medications in the past:    Miralax daily and senna- not helpful and caused lots of gas.   Amitiza caused rash on legs.  Trulance and Motegrity caused more bloating and gas, then would vomit and get shaky when defecating.  She would have a bowel movement every 2 to 3 days, however on those days would have 3-4 bowel movements per day associated with urgency and sometimes leakage.  She had an episode of nocturnal defecation and so decided to stop these medications.  Linzess caused diarrhea and fecal incontinence, even at lower doses.     Colonoscopy was normal 1 year ago.  She felt much better after the colonoscopy prep.    Pelvic floor center evaluation essentially normal including EMG (equivocal) and defecography (small rectocele which completely emptied). 3 vaginal deliveries with tearing during all of deliveries-now children are age 23, 20 and 15.    Non smoker. Little alcohol once or twice a month. No other drugs or opiates. Venlafaxine started ~5 months ago. Takes trazodone nightly.     ROS:    No fevers or chills  No weight loss  No blurry vision, double vision or change in vision  No sore throat  No lymphadenopathy  No headache, paraesthesias, or weakness in a limb  No shortness of breath or wheezing  No chest pain or pressure  No arthralgias or  "myalgias  No rashes or skin changes  No odynophagia or dysphagia  No BRBPR, hematochezia, melena  No dysuria, frequency or urgency  No hot/cold intolerance or polyria  No anxiety or depression    PROBLEM LIST  Constipation    PERTINENT PAST MEDICAL HISTORY:  Constipation, depression    PREVIOUS SURGERIES:  Umbilical hernia repair and torn rectus muscle repair    PREVIOUS ENDOSCOPY:  Colonoscopy at age 39 and 49 normal    ALLERGIES:   Allergies not on file    PERTINENT MEDICATIONS:    Current Outpatient Medications:      Biotin (SUPER BIOTIN) 5 MG TABS, , Disp: , Rfl:      Cholecalciferol 100 MCG (4000 UT) CAPS, Take 2,000 mg by mouth, Disp: , Rfl:      magnesium oxide (MAG-OX) 400 (241.3 Mg) MG tablet, Take 1 tablet (400 mg) by mouth 2 times daily, Disp: 60 tablet, Rfl: 11     polyethylene glycol (MIRALAX) packet, Take 238 g by mouth once for 1 dose One 8.3-ounce bottle (238 g).  Refer to \"Getting Ready for a Colonoscopy\" patient handout, Disp: 238 g, Rfl: 1     polyethylene glycol (MIRALAX/GLYCOLAX) powder, Take 17 g (1 capful) by mouth daily, Disp: 507 g, Rfl: 11     SENNA CO, Take 2 tablets by mouth, Disp: , Rfl:      sennosides (SENOKOT) 8.6 MG tablet, Take 1 tablet by mouth daily, Disp: 90 tablet, Rfl: 3     traZODone (DESYREL) 50 MG tablet, , Disp: , Rfl:      venlafaxine (EFFEXOR-XR) 75 MG 24 hr capsule, , Disp: , Rfl:     SOCIAL HISTORY:  Social History     Socioeconomic History     Marital status:      Spouse name: Not on file     Number of children: Not on file     Years of education: Not on file     Highest education level: Not on file   Occupational History     Not on file   Social Needs     Financial resource strain: Not on file     Food insecurity:     Worry: Not on file     Inability: Not on file     Transportation needs:     Medical: Not on file     Non-medical: Not on file   Tobacco Use     Smoking status: Not on file   Substance and Sexual Activity     Alcohol use: Not on file     Drug use: " Not on file     Sexual activity: Not on file   Lifestyle     Physical activity:     Days per week: Not on file     Minutes per session: Not on file     Stress: Not on file   Relationships     Social connections:     Talks on phone: Not on file     Gets together: Not on file     Attends Restorationism service: Not on file     Active member of club or organization: Not on file     Attends meetings of clubs or organizations: Not on file     Relationship status: Not on file     Intimate partner violence:     Fear of current or ex partner: Not on file     Emotionally abused: Not on file     Physically abused: Not on file     Forced sexual activity: Not on file   Other Topics Concern     Not on file   Social History Narrative     Not on file   Works as a     FAMILY HISTORY:  No family history of colorectal cancer.  Past/family/social history reviewed and no changes    PHYSICAL EXAMINATION:  Constitutional: aaox3, cooperative, pleasant, not dyspneic/diaphoretic, no acute distress  Vitals reviewed: /76 (BP Location: Left arm, Patient Position: Chair, Cuff Size: Adult Regular)   Pulse 99   Ht 1.524 m (5')   Wt 47.6 kg (105 lb)   SpO2 99%   BMI 20.51 kg/m    Wt:   Wt Readings from Last 2 Encounters:   02/03/20 47.6 kg (105 lb)   Eyes: Sclera anicteric/injected  Ears/nose/mouth/throat: Normal oropharynx without ulcers or exudate, mucus membranes moist, hearing intact  Neck: supple, thyroid normal size  CV: No edema  Respiratory: Unlabored breathing  Lymph: No cervical lymphadenopathy  Abd: Nondistended, +bs, no hepatosplenomegaly, nontender, no peritoneal signs  Skin: warm, perfused, no jaundice  Psych: Normal affect  MSK: Normal gait    PERTINENT STUDIES:  TSH 10/9/2019 normal.    Abdominal x-ray-day 5 of the sits marker exam.  There are 7 of 24 residual sits markers (positive study) there is a large volume of stool in the colon.    Abdominal x-ray day 1 of sits marker exam-24 markers scattered  throughout the bowel most within the region of the colon predominantly in the descending colon.  Moderate amount of stool.  Nothing for obstruction or free air.    Answers for HPI/ROS submitted by the patient on 2/3/2020   General Symptoms: No  Skin Symptoms: No  HENT Symptoms: No  EYE SYMPTOMS: No  HEART SYMPTOMS: No  LUNG SYMPTOMS: No  INTESTINAL SYMPTOMS: Yes  URINARY SYMPTOMS: No  GYNECOLOGIC SYMPTOMS: No  BREAST SYMPTOMS: No  SKELETAL SYMPTOMS: Yes  BLOOD SYMPTOMS: Yes  NERVOUS SYSTEM SYMPTOMS: No  MENTAL HEALTH SYMPTOMS: No  Heart burn or indigestion: No  Nausea: No  Vomiting: No  Abdominal pain: No  Bloating: Yes  Constipation: Yes  Diarrhea: No  Back pain: No  Muscle aches: No  Neck pain: No  Swollen joints: Yes  Joint pain: Yes  Bone pain: No  Muscle cramps: No  Anemia: No  Swollen glands: No  Easy bleeding or bruising: Yes  Edema or swelling: Yes

## 2020-02-03 NOTE — LETTER
2/3/2020       RE: Keena Vee  [address]   [facility] [address]     Dear Colleague,    Thank you for referring your patient, Keena Vee, to the [facility] GASTROENTEROLOGY AND IBD CLINIC at [facility]. Please see a copy of my visit note below.    GI CLINIC VISIT    CC/REFERRING MD:  Surekha Contreras    REASON FOR CONSULTATION:   The pt is a 49 year old female who I was asked to see in consultation at the request of Dr. Surekha Contreras for constipation.    ASSESSMENT/PLAN:  49-year-old woman with depression and slow transit constipation who presents for management of constipation.    Work-up thus far reassuringly normal and suggestive of slow transit constipation.  Sitzmarks are demonstrated retained markers at day 5 (although mostly in distal colon).  Pelvic floor evaluation was essentially unremarkable.  Electrolytes previously normal and symptoms have not changed since prior lab evaluation.    Reassuringly, she had too strong of the response to laxatives/secretagogues and these were needed to be stopped due to side effects.  She will likely benefit from a more moderate approach to constipation, although it may take some tailoring.    -MiraLAX colonoscopy prep to clean out  -Prunes for breakfast  -MiraLAX 17 g daily  -Senna once per day  -Magnesium oxide twice per day  -If this causes too much diarrhea, first decrease magnesium oxide, then decrease senna  -If still constipated, may be beneficial to start Linzess 72mcg QOD   -May be beneficial to try squatty potty, although again pelvic floor evaluation was normal    RTC PRN-she will call if additional appointment is desired.  Okay to see myself or OLEG Mcrae.    Thank you for this consultation.  It was a pleasure to participate in the care of this patient; please contact us with any further questions.      Elmer Douglass MD    Division of Gastroenterology, Hepatology and  [facility]      HPI  49-year-old woman with depression and slow transit constipation who presents for management of constipation.    She ties the onset of constipation to surgery in 2006 for umbilical hernia and torn rectus muscle.  Since then, she has had a bowel movement approximately once per week.  She needs to strain to defecate and will often sit on the toilet for 15 minutes.  However, the stool is usually soft.  She will have some nausea and cramping when defecating.  She notes bloating and gas from many different kinds of food.  She feels like she completely empties her rectum.  No blood or melena.  Lost 15 pounds on purpose 1.5 years ago, since then weights been stable.  No family history of colorectal cancer.  Currently, she takes senna once per day and massages her abdomen in the morning-this is approximately the best she is felt and is having a bowel movement once every 5 days.    She is tried a variety of different medications in the past:    Miralax daily and senna- not helpful and caused lots of gas.   Amitiza caused rash on legs.  Trulance and Motegrity caused more bloating and gas, then would vomit and get shaky when defecating.  She would have a bowel movement every 2 to 3 days, however on those days would have 3-4 bowel movements per day associated with urgency and sometimes leakage.  She had an episode of nocturnal defecation and so decided to stop these medications.  Linzess caused diarrhea and fecal incontinence, even at lower doses.     Colonoscopy was normal 1 year ago.  She felt much better after the colonoscopy prep.    Pelvic floor center evaluation essentially normal including EMG (equivocal) and defecography (small rectocele which completely emptied). 3 vaginal deliveries with tearing during all of deliveries-now children are age 23, 20 and 15.    Non smoker. Little alcohol once or twice a month. No other drugs or opiates. Venlafaxine started ~5 months ago. Takes  "trazodone nightly.     ROS:    No fevers or chills  No weight loss  No blurry vision, double vision or change in vision  No sore throat  No lymphadenopathy  No headache, paraesthesias, or weakness in a limb  No shortness of breath or wheezing  No chest pain or pressure  No arthralgias or myalgias  No rashes or skin changes  No odynophagia or dysphagia  No BRBPR, hematochezia, melena  No dysuria, frequency or urgency  No hot/cold intolerance or polyria  No anxiety or depression    PROBLEM LIST  Constipation    PERTINENT PAST MEDICAL HISTORY:  Constipation, depression    PREVIOUS SURGERIES:  Umbilical hernia repair and torn rectus muscle repair    PREVIOUS ENDOSCOPY:  Colonoscopy at age 39 and 49 normal    ALLERGIES:   Allergies not on file    PERTINENT MEDICATIONS:    Current Outpatient Medications:      Biotin (SUPER BIOTIN) 5 MG TABS, , Disp: , Rfl:      Cholecalciferol 100 MCG (4000 UT) CAPS, Take 2,000 mg by mouth, Disp: , Rfl:      magnesium oxide (MAG-OX) 400 (241.3 Mg) MG tablet, Take 1 tablet (400 mg) by mouth 2 times daily, Disp: 60 tablet, Rfl: 11     polyethylene glycol (MIRALAX) packet, Take 238 g by mouth once for 1 dose One 8.3-ounce bottle (238 g).  Refer to \"Getting Ready for a Colonoscopy\" patient handout, Disp: 238 g, Rfl: 1     polyethylene glycol (MIRALAX/GLYCOLAX) powder, Take 17 g (1 capful) by mouth daily, Disp: 507 g, Rfl: 11     SENNA CO, Take 2 tablets by mouth, Disp: , Rfl:      sennosides (SENOKOT) 8.6 MG tablet, Take 1 tablet by mouth daily, Disp: 90 tablet, Rfl: 3     traZODone (DESYREL) 50 MG tablet, , Disp: , Rfl:      venlafaxine (EFFEXOR-XR) 75 MG 24 hr capsule, , Disp: , Rfl:     SOCIAL HISTORY:  Social History     Socioeconomic History     Marital status:      Spouse name: Not on file     Number of children: Not on file     Years of education: Not on file     Highest education level: Not on file   Occupational History     Not on file   Social Needs     Financial resource " strain: Not on file     Food insecurity:     Worry: Not on file     Inability: Not on file     Transportation needs:     Medical: Not on file     Non-medical: Not on file   Tobacco Use     Smoking status: Not on file   Substance and Sexual Activity     Alcohol use: Not on file     Drug use: Not on file     Sexual activity: Not on file   Lifestyle     Physical activity:     Days per week: Not on file     Minutes per session: Not on file     Stress: Not on file   Relationships     Social connections:     Talks on phone: Not on file     Gets together: Not on file     Attends Spiritism service: Not on file     Active member of club or organization: Not on file     Attends meetings of clubs or organizations: Not on file     Relationship status: Not on file     Intimate partner violence:     Fear of current or ex partner: Not on file     Emotionally abused: Not on file     Physically abused: Not on file     Forced sexual activity: Not on file   Other Topics Concern     Not on file   Social History Narrative     Not on file   Works as a     FAMILY HISTORY:  No family history of colorectal cancer.  Past/family/social history reviewed and no changes    PHYSICAL EXAMINATION:  Constitutional: aaox3, cooperative, pleasant, not dyspneic/diaphoretic, no acute distress  Vitals reviewed: /76 (BP Location: Left arm, Patient Position: Chair, Cuff Size: Adult Regular)   Pulse 99   Ht 1.524 m (5')   Wt 47.6 kg (105 lb)   SpO2 99%   BMI 20.51 kg/m     Wt:   Wt Readings from Last 2 Encounters:   02/03/20 47.6 kg (105 lb)   Eyes: Sclera anicteric/injected  Ears/nose/mouth/throat: Normal oropharynx without ulcers or exudate, mucus membranes moist, hearing intact  Neck: supple, thyroid normal size  CV: No edema  Respiratory: Unlabored breathing  Lymph: No cervical lymphadenopathy  Abd: Nondistended, +bs, no hepatosplenomegaly, nontender, no peritoneal signs  Skin: warm, perfused, no jaundice  Psych: Normal  affect  MSK: Normal gait    PERTINENT STUDIES:  TSH 10/9/2019 normal.    Abdominal x-ray-day 5 of the sits marker exam.  There are 7 of 24 residual sits markers (positive study) there is a large volume of stool in the colon.    Abdominal x-ray day 1 of sits marker exam-24 markers scattered throughout the bowel most within the region of the colon predominantly in the descending colon.  Moderate amount of stool.  Nothing for obstruction or free air.    Again, thank you for allowing me to participate in the care of your patient.      Sincerely,    Elmer Douglass MD

## 2020-02-03 NOTE — PATIENT INSTRUCTIONS
It was a pleasure taking care of you today.  I've included a brief summary of our discussion and care plan from today's visit below.  Please review this information with your primary care provider.  _______________________________________________________________________    My recommendations are summarized as follows:  1. Take Miralax colonoscopy prep on a Saturday or Sunday when you are free.   2. After the colonoscopy prep, start Miralax 17g once per day, Senna once per day, and magnesium oxide twice per day.   3. Eat prunes (5 to 10) for breakfast.   4. If you are having too much diarrhea, first decrease the magnesium oxide to once per day. If still having diarrhea, stop Senna.   5. Call us with an update in 1 month. We can set up an appointment at that time if needed.   6. If no BM for 4 days, it is ok to take one dose of magnesium citrate.   7. If this isn't helpful, we can try stronger medications (like Motegrity or Linzess) at smaller doses or every other day.    Please call our nurse Kristin with any questions or concerns- [phone].  --    Return to GI Clinic as needed to review your progress.    _______________________________________________________________________    Who do I call with any questions after my visit?  Please be in touch if there are any further questions that arise following today's visit.  There are multiple ways to contact your gastroenterology care team.        During business hours, you may reach a Gastroenterology nurse at [phone] and choose option 3.         To schedule or reschedule an appointment, please call [phone].       You can always send a secure message through [employer].  [employer] messages are answered by your nurse or doctor typically within 24 hours.  Please allow extra time on weekends and holidays.        For urgent/emergent questions after business hours, you may reach the on-call GI Fellow by contacting the [facility]  at [phone].      How will I get the results of any tests ordered?    You will receive all of your results.  If you have signed up for [employer], any tests ordered at your visit will be available to you after your physician reviews them.  Typically this takes 1-2 weeks.  If there are urgent results that require a change in your care plan, your physician or nurse will call you to discuss the next steps.      What is [employer]?  [employer] is a secure way for you to access all of your healthcare records from the [facility].  It is a web based computer program, so you can sign on to it from any location.  It also allows you to send secure messages to your care team.  I recommend signing up for [employer] access if you have not already done so and are comfortable with using a computer.      How to I schedule a follow-up visit?  If you did not schedule a follow-up visit today, please call [phone] to schedule a follow-up office visit.        Sincerely,    Elmer Douglass MD     [facility]  Division of Gastroenterology

## 2020-02-03 NOTE — NURSING NOTE
Chief Complaint   Patient presents with     Consult     consult for constipation/bloating/eructation       Vitals:    02/03/20 1432   BP: 136/76   BP Location: Left arm   Patient Position: Chair   Cuff Size: Adult Regular   Pulse: 99   SpO2: 99%   Weight: 47.6 kg (105 lb)   Height: 1.524 m (5')       Body mass index is 20.51 kg/m .    aMciej Mcallister, EMT

## 2020-02-07 ENCOUNTER — PATIENT OUTREACH (OUTPATIENT)
Dept: GASTROENTEROLOGY | Facility: CLINIC | Age: 50
End: 2020-02-07

## 2020-02-20 ENCOUNTER — RECORDS - HEALTHEAST (OUTPATIENT)
Dept: ADMINISTRATIVE | Facility: OTHER | Age: 50
End: 2020-02-20

## 2020-02-25 ENCOUNTER — COMMUNICATION - HEALTHEAST (OUTPATIENT)
Dept: FAMILY MEDICINE | Facility: CLINIC | Age: 50
End: 2020-02-25

## 2020-02-25 DIAGNOSIS — F41.1 GENERALIZED ANXIETY DISORDER: ICD-10-CM

## 2020-03-02 ENCOUNTER — COMMUNICATION - HEALTHEAST (OUTPATIENT)
Dept: FAMILY MEDICINE | Facility: CLINIC | Age: 50
End: 2020-03-02

## 2020-03-18 ENCOUNTER — COMMUNICATION - HEALTHEAST (OUTPATIENT)
Dept: FAMILY MEDICINE | Facility: CLINIC | Age: 50
End: 2020-03-18

## 2020-03-18 DIAGNOSIS — F41.1 GENERALIZED ANXIETY DISORDER: ICD-10-CM

## 2020-04-28 ENCOUNTER — COMMUNICATION - HEALTHEAST (OUTPATIENT)
Dept: FAMILY MEDICINE | Facility: CLINIC | Age: 50
End: 2020-04-28

## 2020-04-28 DIAGNOSIS — F41.1 GENERALIZED ANXIETY DISORDER: ICD-10-CM

## 2020-04-29 ENCOUNTER — COMMUNICATION - HEALTHEAST (OUTPATIENT)
Dept: FAMILY MEDICINE | Facility: CLINIC | Age: 50
End: 2020-04-29

## 2020-04-29 DIAGNOSIS — F41.1 GENERALIZED ANXIETY DISORDER: ICD-10-CM

## 2020-05-18 ENCOUNTER — COMMUNICATION - HEALTHEAST (OUTPATIENT)
Dept: FAMILY MEDICINE | Facility: CLINIC | Age: 50
End: 2020-05-18

## 2020-05-18 DIAGNOSIS — F41.1 GENERALIZED ANXIETY DISORDER: ICD-10-CM

## 2020-05-22 ENCOUNTER — COMMUNICATION - HEALTHEAST (OUTPATIENT)
Dept: SCHEDULING | Facility: CLINIC | Age: 50
End: 2020-05-22

## 2020-06-11 ENCOUNTER — OFFICE VISIT - HEALTHEAST (OUTPATIENT)
Dept: FAMILY MEDICINE | Facility: CLINIC | Age: 50
End: 2020-06-11

## 2020-06-11 DIAGNOSIS — F41.1 GENERALIZED ANXIETY DISORDER: ICD-10-CM

## 2020-06-11 ASSESSMENT — ANXIETY QUESTIONNAIRES
3. WORRYING TOO MUCH ABOUT DIFFERENT THINGS: NOT AT ALL
4. TROUBLE RELAXING: NOT AT ALL
GAD7 TOTAL SCORE: 0
7. FEELING AFRAID AS IF SOMETHING AWFUL MIGHT HAPPEN: NOT AT ALL
1. FEELING NERVOUS, ANXIOUS, OR ON EDGE: NOT AT ALL
5. BEING SO RESTLESS THAT IT IS HARD TO SIT STILL: NOT AT ALL
2. NOT BEING ABLE TO STOP OR CONTROL WORRYING: NOT AT ALL
6. BECOMING EASILY ANNOYED OR IRRITABLE: NOT AT ALL
IF YOU CHECKED OFF ANY PROBLEMS ON THIS QUESTIONNAIRE, HOW DIFFICULT HAVE THESE PROBLEMS MADE IT FOR YOU TO DO YOUR WORK, TAKE CARE OF THINGS AT HOME, OR GET ALONG WITH OTHER PEOPLE: NOT DIFFICULT AT ALL

## 2020-06-11 NOTE — ASSESSMENT & PLAN NOTE
The patient is currently on 112.5 mg dosage and is doing well.  I refilled the patient's 37.5 mg capsule and provided her with a 90-day supply.

## 2020-06-24 ENCOUNTER — AMBULATORY - HEALTHEAST (OUTPATIENT)
Dept: NURSING | Facility: CLINIC | Age: 50
End: 2020-06-24

## 2020-06-25 ENCOUNTER — COMMUNICATION - HEALTHEAST (OUTPATIENT)
Dept: FAMILY MEDICINE | Facility: CLINIC | Age: 50
End: 2020-06-25

## 2020-07-06 ENCOUNTER — HOSPITAL ENCOUNTER (OUTPATIENT)
Dept: MAMMOGRAPHY | Facility: CLINIC | Age: 50
Discharge: HOME | End: 2020-07-06
Attending: OBSTETRICS & GYNECOLOGY

## 2020-07-06 DIAGNOSIS — Z12.31 VISIT FOR SCREENING MAMMOGRAM: ICD-10-CM

## 2020-07-09 ENCOUNTER — VIRTUAL VISIT (OUTPATIENT)
Dept: FAMILY MEDICINE | Facility: OTHER | Age: 50
End: 2020-07-09

## 2020-07-09 ENCOUNTER — AMBULATORY - HEALTHEAST (OUTPATIENT)
Dept: FAMILY MEDICINE | Facility: CLINIC | Age: 50
End: 2020-07-09

## 2020-07-09 DIAGNOSIS — Z20.822 SUSPECTED COVID-19 VIRUS INFECTION: ICD-10-CM

## 2020-07-09 NOTE — PROGRESS NOTES
"Date: 2020 07:46:31  Clinician: Teressa Viramontes  Clinician NPI: [id number]  Patient: Keena Vee  Patient : 1970  Patient Address: [address] [place] [address]  Patient Phone: [phone]  Visit Protocol: URI  Patient Summary:  Keena is a 50 year old ( : 1970 ) female who initiated a Visit for COVID-19 (Coronavirus) evaluation and screening. When asked the question \"Please sign me up to receive news, health information and promotions from [employer].\", Keena responded \"No\".    Keena states her symptoms started 1-2 days ago.   Her symptoms consist of a headache, a sore throat, facial pain or pressure, and nasal congestion.   Symptom details     Nasal secretions: The color of her mucus is clear.    Sore throat: Keena reports having mild throat pain (1-3 on a 10 point pain scale), does not have exudate on her tonsils, and can swallow liquids. She is not sure if the lymph nodes in her neck are enlarged. A rash has not appeared on the skin since the sore throat started.     Facial pain or pressure: The facial pain or pressure does not feel worse when bending or leaning forward.     Headache: She states the headache is moderate (4-6 on a 10 point pain scale).      Keena denies having wheezing, nausea, teeth pain, ageusia, diarrhea, vomiting, rhinitis, malaise, ear pain, chills, myalgias, anosmia, fever, and cough. She also denies having recent facial or sinus surgery in the past 60 days and taking antibiotic medication in the past month. She is not experiencing dyspnea.   Precipitating events  Within the past week, Keena has not been exposed to someone with strep throat.   Pertinent COVID-19 (Coronavirus) information  In the past 14 days, Keena has not worked in a congregate living setting.   She does not work or volunteer as healthcare worker or a  and does not work or volunteer in a healthcare facility.   Keena also has not lived in a congregate living setting in " the past 14 days. She does not live with a healthcare worker.   Keena has not had a close contact with a laboratory-confirmed COVID-19 patient within 14 days of symptom onset.   Pertinent medical history  Keena does not get yeast infections when she takes antibiotics.   Keena does not need a return to work/school note.   Weight: 100 lbs   Keena does not smoke or use smokeless tobacco.   Weight: 100 lbs    MEDICATIONS: calcium carbonate-calcium citrate-vitamin D3 oral, biotin-calcium carbonate oral, vitamin B complex-vitamin C-folic acid oral, magnesium oxide-magnesium amino acid chelate oral, Allegra-D 24 Hour oral, venlafaxine oral, ALLERGIES: NKDA  Clinician Response:  Dear Keena,   Your symptoms show that you may have coronavirus (COVID-19). This illness can cause fever, cough and trouble breathing. Many people get a mild case and get better on their own. Some people can get very sick.  What should I do?  We would like to test you for this virus.   1. Please call [phone] to schedule your visit. Explain that you were referred by [facility] to have a COVID-19 test. Be ready to share your [facility] visit ID number.  The following will serve as your written order for this COVID Test, ordered by me, for the indication of suspected COVID [Z20.828]: The test will be ordered in jigl, our electronic health record, after you are scheduled. It will show as ordered and authorized by Alex Maza MD.  Order: COVID-19 (Coronavirus) PCR for SYMPTOMATIC testing from [facility].      2. When it's time for your COVID test:  Stay at least 6 feet away from others. (If someone will drive you to your test, stay in the backseat, as far away from the  as you can.)   Cover your mouth and nose with a mask, tissue or washcloth.  Go straight to the testing site. Don't make any stops on the way there or back.      3.Starting now: Stay home and away from others (self-isolate) until:   You've had no fever---and no medicine that reduces  "fever---for 3 full days (72 hours). And...   Your other symptoms have gotten better. For example, your cough or breathing has improved. And...   At least 10 days have passed since your symptoms started.       During this time, don't leave the house except for testing or medical care.   Stay in your own room, even for meals. Use your own bathroom if you can.   Stay away from others in your home. No hugging, kissing or shaking hands. No visitors.  Don't go to work, school or anywhere else.    Clean \"high touch\" surfaces often (doorknobs, counters, handles, etc.). Use a household cleaning spray or wipes. You'll find a full list of  on the EPA website: www.epa.gov/pesticide-registration/list-n-disinfectants-use-against-sars-cov-2.   Cover your mouth and nose with a mask, tissue or washcloth to avoid spreading germs.  Wash your hands and face often. Use soap and water.  Caregivers in these groups are at risk for severe illness due to COVID-19:  o People 65 years and older  o People who live in a nursing home or long-term care facility  o People with chronic disease (lung, heart, cancer, diabetes, kidney, liver, immunologic)  o People who have a weakened immune system, including those who:   Are in cancer treatment  Take medicine that weakens the immune system, such as corticosteroids  Had a bone marrow or organ transplant  Have an immune deficiency  Have poorly controlled HIV or AIDS  Are obese (body mass index of 40 or higher)  Smoke regularly   o Caregivers should wear gloves while washing dishes, handling laundry and cleaning bedrooms and bathrooms.  o Use caution when washing and drying laundry: Don't shake dirty laundry, and use the warmest water setting that you can.  o For more tips, go to www.cdc.gov/coronavirus/2019-ncov/downloads/10Things.pdf.    4.Sign up for [employer]. We know it's scary to hear that you might have COVID-19. We want to track your symptoms to make sure you're okay over the next 2 " weeks. Please look for an email from [employer]---this is a free, online program that we'll use to keep in touch. To sign up, follow the link in the email. Learn more at [email or web address]  How can I take care of myself?   Get lots of rest. Drink extra fluids (unless a doctor has told you not to).   Take Tylenol (acetaminophen) for fever or pain. If you have liver or kidney problems, ask your family doctor if it's okay to take Tylenol.   Adults can take either:    650 mg (two 325 mg pills) every 4 to 6 hours, or...   1,000 mg (two 500 mg pills) every 8 hours as needed.    Note: Don't take more than 3,000 mg in one day. Acetaminophen is found in many medicines (both prescribed and over-the-counter medicines). Read all labels to be sure you don't take too much.   For children, check the Tylenol bottle for the right dose. The dose is based on the child's age or weight.    If you have other health problems (like cancer, heart failure, an organ transplant or severe kidney disease): Call your specialty clinic if you don't feel better in the next 2 days.       Know when to call 911. Emergency warning signs include:    Trouble breathing or shortness of breath Pain or pressure in the chest that doesn't go away Feeling confused like you haven't felt before, or not being able to wake up Bluish-colored lips or face.  Where can I get more information?   [facility] -- About COVID-19: [email or web address]   CDC -- What to Do If You're Sick: www.cdc.gov/coronavirus/2019-ncov/about/steps-when-sick.html   CDC -- Ending Home Isolation: www.cdc.gov/coronavirus/2019-ncov/hcp/disposition-in-home-patients.html   CDC -- Caring for Someone: www.cdc.gov/coronavirus/2019-ncov/if-you-are-sick/care-for-someone.html   [facility] -- Interim Guidance for Hospital Discharge to Home: www.health.[facility].[place]./diseases/coronavirus/hcp/hospdischarge.pdf   [facility] clinical trials (COVID-19 research studies):  clinicalaffairs.[facility]-clinical-trials    Below are the COVID-19 hotlines at the [place] Department of [facility] ([facility]). Interpreters are available.    For health questions: Call 226-240-1287 or 1-241.947.5145 (7 a.m. to 7 p.m.) For questions about schools and childcare: Call 921-617-8516 or 1-552.729.4532 (7 a.m. to 7 p.m.)    Diagnosis: Acute pharyngitis, unspecified  Diagnosis ICD: J02.9

## 2020-07-11 ENCOUNTER — AMBULATORY - HEALTHEAST (OUTPATIENT)
Dept: FAMILY MEDICINE | Facility: CLINIC | Age: 50
End: 2020-07-11

## 2020-07-11 DIAGNOSIS — Z20.822 SUSPECTED COVID-19 VIRUS INFECTION: ICD-10-CM

## 2020-07-15 ENCOUNTER — COMMUNICATION - HEALTHEAST (OUTPATIENT)
Dept: FAMILY MEDICINE | Facility: CLINIC | Age: 50
End: 2020-07-15

## 2020-08-11 ENCOUNTER — RECORDS - HEALTHEAST (OUTPATIENT)
Dept: ADMINISTRATIVE | Facility: OTHER | Age: 50
End: 2020-08-11

## 2020-08-13 ENCOUNTER — OFFICE VISIT - HEALTHEAST (OUTPATIENT)
Dept: FAMILY MEDICINE | Facility: CLINIC | Age: 50
End: 2020-08-13

## 2020-08-13 DIAGNOSIS — K64.4 SKIN TAG OF PERIANAL REGION: ICD-10-CM

## 2020-08-13 DIAGNOSIS — M79.652 PAIN OF LEFT THIGH: ICD-10-CM

## 2020-08-20 ENCOUNTER — RECORDS - HEALTHEAST (OUTPATIENT)
Dept: ADMINISTRATIVE | Facility: OTHER | Age: 50
End: 2020-08-20

## 2020-08-28 ENCOUNTER — COMMUNICATION - HEALTHEAST (OUTPATIENT)
Dept: INTERNAL MEDICINE | Facility: CLINIC | Age: 50
End: 2020-08-28

## 2020-08-31 ENCOUNTER — RECORDS - HEALTHEAST (OUTPATIENT)
Dept: ADMINISTRATIVE | Facility: OTHER | Age: 50
End: 2020-08-31

## 2020-09-15 ENCOUNTER — RECORDS - HEALTHEAST (OUTPATIENT)
Dept: ADMINISTRATIVE | Facility: OTHER | Age: 50
End: 2020-09-15

## 2020-09-19 ENCOUNTER — AMBULATORY - HEALTHEAST (OUTPATIENT)
Dept: NURSING | Facility: CLINIC | Age: 50
End: 2020-09-19

## 2020-10-24 ENCOUNTER — COMMUNICATION - HEALTHEAST (OUTPATIENT)
Dept: FAMILY MEDICINE | Facility: CLINIC | Age: 50
End: 2020-10-24

## 2020-10-24 DIAGNOSIS — F41.1 GENERALIZED ANXIETY DISORDER: ICD-10-CM

## 2020-10-27 ENCOUNTER — AMBULATORY - HEALTHEAST (OUTPATIENT)
Dept: NURSING | Facility: CLINIC | Age: 50
End: 2020-10-27

## 2020-10-30 ENCOUNTER — RECORDS - HEALTHEAST (OUTPATIENT)
Dept: ADMINISTRATIVE | Facility: OTHER | Age: 50
End: 2020-10-30

## 2020-11-10 ENCOUNTER — RECORDS - HEALTHEAST (OUTPATIENT)
Dept: ADMINISTRATIVE | Facility: OTHER | Age: 50
End: 2020-11-10

## 2020-11-29 ENCOUNTER — COMMUNICATION - HEALTHEAST (OUTPATIENT)
Dept: FAMILY MEDICINE | Facility: CLINIC | Age: 50
End: 2020-11-29

## 2020-12-02 ENCOUNTER — COMMUNICATION - HEALTHEAST (OUTPATIENT)
Dept: FAMILY MEDICINE | Facility: CLINIC | Age: 50
End: 2020-12-02

## 2020-12-16 ENCOUNTER — COMMUNICATION - HEALTHEAST (OUTPATIENT)
Dept: FAMILY MEDICINE | Facility: CLINIC | Age: 50
End: 2020-12-16

## 2020-12-18 ENCOUNTER — COMMUNICATION - HEALTHEAST (OUTPATIENT)
Dept: SCHEDULING | Facility: CLINIC | Age: 50
End: 2020-12-18

## 2020-12-21 ENCOUNTER — OFFICE VISIT - HEALTHEAST (OUTPATIENT)
Dept: FAMILY MEDICINE | Facility: CLINIC | Age: 50
End: 2020-12-21

## 2020-12-21 ENCOUNTER — COMMUNICATION - HEALTHEAST (OUTPATIENT)
Dept: FAMILY MEDICINE | Facility: CLINIC | Age: 50
End: 2020-12-21

## 2020-12-21 DIAGNOSIS — R07.1 PAINFUL BREATHING: ICD-10-CM

## 2020-12-21 DIAGNOSIS — R00.2 PALPITATIONS: ICD-10-CM

## 2020-12-21 DIAGNOSIS — Z86.16 HISTORY OF 2019 NOVEL CORONAVIRUS DISEASE (COVID-19): ICD-10-CM

## 2020-12-21 LAB
ATRIAL RATE - MUSE: 84 BPM
DIASTOLIC BLOOD PRESSURE - MUSE: NORMAL
INTERPRETATION ECG - MUSE: NORMAL
P AXIS - MUSE: 15 DEGREES
PR INTERVAL - MUSE: 114 MS
QRS DURATION - MUSE: 78 MS
QT - MUSE: 348 MS
QTC - MUSE: 411 MS
R AXIS - MUSE: 29 DEGREES
SYSTOLIC BLOOD PRESSURE - MUSE: NORMAL
T AXIS - MUSE: 40 DEGREES
VENTRICULAR RATE- MUSE: 84 BPM

## 2021-02-22 ENCOUNTER — RECORDS - HEALTHEAST (OUTPATIENT)
Dept: ADMINISTRATIVE | Facility: OTHER | Age: 51
End: 2021-02-22

## 2021-03-08 ENCOUNTER — COMMUNICATION - HEALTHEAST (OUTPATIENT)
Dept: FAMILY MEDICINE | Facility: CLINIC | Age: 51
End: 2021-03-08

## 2021-03-08 DIAGNOSIS — F41.1 GENERALIZED ANXIETY DISORDER: ICD-10-CM

## 2021-03-17 ENCOUNTER — COMMUNICATION - HEALTHEAST (OUTPATIENT)
Dept: FAMILY MEDICINE | Facility: CLINIC | Age: 51
End: 2021-03-17

## 2021-04-18 ENCOUNTER — COMMUNICATION - HEALTHEAST (OUTPATIENT)
Dept: FAMILY MEDICINE | Facility: CLINIC | Age: 51
End: 2021-04-18

## 2021-04-26 ENCOUNTER — OFFICE VISIT - HEALTHEAST (OUTPATIENT)
Dept: FAMILY MEDICINE | Facility: CLINIC | Age: 51
End: 2021-04-26

## 2021-04-26 DIAGNOSIS — R63.1 POLYDIPSIA: ICD-10-CM

## 2021-04-26 LAB
ANION GAP SERPL CALCULATED.3IONS-SCNC: 10 MMOL/L (ref 5–18)
BUN SERPL-MCNC: 11 MG/DL (ref 8–22)
CALCIUM SERPL-MCNC: 9.2 MG/DL (ref 8.5–10.5)
CHLORIDE BLD-SCNC: 104 MMOL/L (ref 98–107)
CO2 SERPL-SCNC: 25 MMOL/L (ref 22–31)
CREAT SERPL-MCNC: 0.77 MG/DL (ref 0.6–1.1)
GFR SERPL CREATININE-BSD FRML MDRD: >60 ML/MIN/1.73M2
GLUCOSE BLD-MCNC: 87 MG/DL (ref 70–125)
HBA1C MFR BLD: 5.3 %
OSMOLALITY UR: 245 MOSM/KG (ref 300–900)
POTASSIUM BLD-SCNC: 4.7 MMOL/L (ref 3.5–5)
SODIUM SERPL-SCNC: 139 MMOL/L (ref 136–145)

## 2021-04-26 ASSESSMENT — ANXIETY QUESTIONNAIRES
7. FEELING AFRAID AS IF SOMETHING AWFUL MIGHT HAPPEN: NOT AT ALL
5. BEING SO RESTLESS THAT IT IS HARD TO SIT STILL: SEVERAL DAYS
GAD7 TOTAL SCORE: 4
1. FEELING NERVOUS, ANXIOUS, OR ON EDGE: SEVERAL DAYS
6. BECOMING EASILY ANNOYED OR IRRITABLE: SEVERAL DAYS
4. TROUBLE RELAXING: SEVERAL DAYS
2. NOT BEING ABLE TO STOP OR CONTROL WORRYING: NOT AT ALL
IF YOU CHECKED OFF ANY PROBLEMS ON THIS QUESTIONNAIRE, HOW DIFFICULT HAVE THESE PROBLEMS MADE IT FOR YOU TO DO YOUR WORK, TAKE CARE OF THINGS AT HOME, OR GET ALONG WITH OTHER PEOPLE: NOT DIFFICULT AT ALL
3. WORRYING TOO MUCH ABOUT DIFFERENT THINGS: NOT AT ALL

## 2021-05-18 ENCOUNTER — RECORDS - HEALTHEAST (OUTPATIENT)
Dept: ADMINISTRATIVE | Facility: OTHER | Age: 51
End: 2021-05-18

## 2021-05-27 ENCOUNTER — RECORDS - HEALTHEAST (OUTPATIENT)
Dept: ADMINISTRATIVE | Facility: CLINIC | Age: 51
End: 2021-05-27

## 2021-05-27 NOTE — PROGRESS NOTES
"Assessment/Plan:    Tinnitus of both ears  Diagnosis.  Unclear etiology.  Broad differential was considered.  There is no pulsatile quality.  No other neurologic symptoms to suggest central lesion.  She does have symptoms consistent with eustachian tube dysfunction.  She also has a history of TMJ.  She is also been using NSAIDs intermittently.  We discussed each of these conditions.  Given her occupation as a  however, I suspect that her tinnitus is the result of sensorineural hearing loss.  She will work to minimize the effect of the above conditions.  I referred her to audiology and will consider a referral to otolaryngology in the future if her symptoms continue.     Return in about 1 month (around 5/6/2019) for recheck if not improving.    Pepe Avalos MD  _______________________________    Chief Complaint   Patient presents with     Tinnitus     x 3 weeks both ears, pt states she hears a \"humming\"      Subjective: Keena Vee is a 49 y.o. year old female who I have seen in clinic before who presents with the following acute complaint(s):    tinnitus:   - duration: Started 6-7 weeks ago.  Initially high pinch.  It is now a low hum.  Both ears.  \"Like wind.\"  No previous episodes.  No recent URI.  She did flew recently.  No fevers.  No chills.  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  History of sinus congestion but not this year.  Intermittent use of NSAIDs (ibuprofen and diclofenac)    ROS: Complete review of systems obtained.  Pertinent items are listed above.     The following portions of the patient's history were reviewed and updated as appropriate: allergies, current medications, past medical history and problem list.     Objective:   /60 (Patient Site: Left Arm, Patient Position: Sitting, Cuff Size: Adult Regular)   Pulse 74   Temp 98.6  F (37  C) (Oral)   Resp 18   Ht 4' 11.5\" (1.511 m)   Wt 103 lb (46.7 kg)   SpO2 99% Comment: " room air  BMI 20.46 kg/m    Gen.: No acute distress  HEENT: Tympanic membranes bilaterally are gray and glistening.  No postauricular, submandibular, anterior cervical lymphadenopathy.  Posterior pharynx without erythema or tonsillar exudate.  The tympanic membranes are immobile with insufflation.  Cardiac: Regular rate and rhythm, normal S1/S2, no murmurs or gallops  Respiratory: Clear to auscultation bilaterally.    No results found for this or any previous visit (from the past 24 hour(s)).  No results found.    Additional History from Old Records Summarized (2): no  Decision to Obtain Records (1): no  Radiology Tests Summarized or Ordered (1): no  Labs Reviewed or Ordered (1): no  Medicine Test Summarized or Ordered (1): no  Independent Review of EKG or X-RAY(2 each): no    This note has been dictated using voice recognition software. Any grammatical or context distortions are unintentional and inherent to the software

## 2021-05-27 NOTE — PATIENT INSTRUCTIONS - HE
- afrin.  Do not use for more than three days   - pseudophedrine containing medications (the kind you have to get behind the counter at the pharmacy)   - nasal steroids: fluticasone nasal spray (Flonase): We will help expedite resolution of symptoms although will not improve symptoms acutely.    Minimize or avoid ibuprofen or diclofenac

## 2021-05-28 ASSESSMENT — ANXIETY QUESTIONNAIRES
GAD7 TOTAL SCORE: 4
GAD7 TOTAL SCORE: 5
GAD7 TOTAL SCORE: 1
GAD7 TOTAL SCORE: 0

## 2021-05-28 NOTE — PROGRESS NOTES
"Assessment:     1. Dysesthesia         Plan:     Keena is a 49-year-old female presenting today with dysesthesias and some erythema.  I think that this most likely is related to a drug side effect from her doxycycline.  I recommended monitoring for now since she is done with that course of antibiotic and letting me know next week if the symptoms do not resolve.    Subjective:       49 y.o. female presents for evaluation of an unusual burning and tingling sensation that she has been feeling on the backs of her hands as well as her nose for the past 2 to 3 days.  The patient describes that the back of her hands where she has a sensation also looks slightly red.  The patient was started on doxycycline for a bacterial sinusitis and completed that course of antibiotics just today.  She also is on one other new medication, Linzess, by her gastroneurologist for chronic constipation.  The patient denies feeling feverish or chilled is not having any other neurological symptoms.  She was reading on the Internet and became very concerned about the possibility of a peripheral neuropathy as a cause for her symptoms.      Reviewed: The following portions of the patient's history were reviewed and updated as appropriate: allergies, current medications, past family history, past medical history, past social history, past surgical history and problem list.    Review of Systems  Pertinent items are noted in HPI.        Objective:     /78 (Patient Site: Left Arm, Patient Position: Sitting, Cuff Size: Adult Regular)   Pulse 66   Ht 4' 11.5\" (1.511 m)   Wt 103 lb (46.7 kg)   BMI 20.46 kg/m    General appearance: alert, appears stated age and cooperative  Skin: slight erythema of the skin overlying her knuckles bilaterally       This note has been dictated using voice recognition software. Any grammatical or context distortions are unintentional and inherent to the software  "

## 2021-05-28 NOTE — TELEPHONE ENCOUNTER
Patient Returning Call  Reason for call:  Patient called back.  Information relayed to patient:  n/a  Patient has additional questions:  Yes  If YES, what are your questions/concerns:  Patient states she would like to come in today. States nurse has said they could squeeze her in today.  Please call patient back.  Okay to leave a detailed message?: Yes

## 2021-05-28 NOTE — PROGRESS NOTES
Keena Vee is a 49 y.o. female seen in consultation at the request of Dr. Contreras for tinnitus. Tinnitus is nonpulsatile in nature.  The patient had noted this for 2-3 months.  It is both ears, but slightly worse in the left.  Patient has noticed some mild hearing loss.  Denies otologic history of infections or surgeries. Denies vertigo.  Noise exposure: No.  Family history of hearing loss: Mother  .      ALLERGY:    Allergies   Allergen Reactions     Latex        MEDICATIONS:     Current Outpatient Medications on File Prior to Visit   Medication Sig Dispense Refill     cholecalciferol, vitamin D3, (VITAMIN D3) 4,000 unit cap Take 2,000 mg by mouth daily.              citalopram (CELEXA) 40 MG tablet Take 1 tablet (40 mg total) by mouth daily. 90 tablet 3     ibuprofen (ADVIL,MOTRIN) 200 MG tablet Take 200 mg by mouth every 6 (six) hours as needed for pain.       linaclotide (LINZESS) 72 mcg cap capsule Take 72 mcg by mouth daily.       traZODone (DESYREL) 50 MG tablet TAKE 1 TABLET BY MOUTH EVERYDAY AT BEDTIME 90 tablet 3     vit B comp no.3-folic-C-biotin (NEPHRO-CHEIKH) 1- mg-mg-mcg Tab tablet Take 1 tablet by mouth daily.       No current facility-administered medications on file prior to visit.        Past Medical/Surgical History, Family History and Social History reviewed in detail and documented separately in the medical record.    Complete Review of Systems:  A 10-point review was performed.  Pertinent positives are noted in the HPI and on a separate scanned document in the chart.    EXAM:  There were no vitals filed for this visit.    Nurse documentation reviewed  and documented separately.    General Appearance: Pleasant, alert, appropriate appearance for age. No acute distress    Head Exam: Normal. Normocephalic, atraumatic.    Eye Exam: Normal external eye, conjunctiva, lids, cornea. Extra-ocular movements are intact.    Left external ear: normal  Left otoscopic exam: Normal EAC. Normal TM      Right external ear: normal  Right otoscopic exam: Normal EAC. Normal TM    Nose Exam: Normal external nose. Septum midline. Nasal mucosa normal.  Inferior turbinates normal.    OroPharynx Exam: Dental hygiene adequate. Normal tongue. Normal buccal mucosa. Normal palate.  Normal pharynx. Normal tonsils.    Neck Exam: Supple, no masses or nodes. Trachea and larynx midline.    Thyroid Exam: No tenderness, nodules or enlargement.    Salivary Glands: nontender without masses    Neuro: Alert and oriented times 3, CN 2-12 grossly intact, no nystagmus, PERRL, EOMI, normal speech and gait    Chest/Respiratory Exam: Normal chest wall motion and respiratory effort. No audible stridor or wheezing.    Cardiovascular Exam: Regular rate and rhythm.  No cyanosis, clubbing or edema.    Pulses: carotid pulses normal    Mood:  Calm, appropriate    Skin:  No conspicuous lesions, rash    ASSESSMENT:  1. Tinnitus of both ears        PLAN: Findings, assessment, and management options were discussed.  Discussed pathophysiology, masking techniques and triggers for tinnitus.  We discussed current guidelines in regards to approach to tinnitus.  Unfortunately there are not many satisfying answers but working on triggers may be helpful.

## 2021-05-28 NOTE — PROGRESS NOTES
Audiology Report    Referring Provider:  Dr. Newman    History:  Keena Vee is seen in conjunction with ENT appointment today. She has a history of intermittent tinnitus, difficulty hearing, and aural fullness in both ears for the past 2 months. Keena reports the tinnitus began 2 months ago with a constant high pitched ring for several days. She now experiences an intermittent low humming tinnitus. She reports her symptoms seem worse in the left ear. Keena reports she had PE tubes as a child. She reports her mom has age-related hearing loss. She denies a history of otalgia, otorrhea, dizziness, and noise exposure.    Results:     Left Ear Right Ear   Otoscopy clear canals clear canals   Pure Tone Audiometry normal hearing normal hearing   Word Recognition excellent excellent   Tympanometry normal (Type A)  normal (Type A)     Transducer: Circumaural headphones    Reliability was good  and there was good  SRT to PTA agreement.       Plan:  The patient is returned to ENT for follow up.  She should return for retesting with ENT recommendation.      Please see audiogram under  media  and  audiogram  in the patient s chart.     Jose A Puentes, [facility]  [place] Licensed Audiologist #[id number]

## 2021-05-29 ENCOUNTER — RECORDS - HEALTHEAST (OUTPATIENT)
Dept: ADMINISTRATIVE | Facility: CLINIC | Age: 51
End: 2021-05-29

## 2021-05-30 ENCOUNTER — RECORDS - HEALTHEAST (OUTPATIENT)
Dept: ADMINISTRATIVE | Facility: CLINIC | Age: 51
End: 2021-05-30

## 2021-05-30 VITALS — BODY MASS INDEX: 23.56 KG/M2 | WEIGHT: 120 LBS | HEIGHT: 60 IN

## 2021-05-30 VITALS — WEIGHT: 115.8 LBS

## 2021-05-30 VITALS — HEIGHT: 60 IN | BODY MASS INDEX: 23.56 KG/M2 | WEIGHT: 120 LBS

## 2021-05-31 VITALS — BODY MASS INDEX: 22.84 KG/M2 | WEIGHT: 116 LBS

## 2021-05-31 VITALS — WEIGHT: 118.2 LBS | HEIGHT: 60 IN | BODY MASS INDEX: 23.2 KG/M2

## 2021-06-01 VITALS — WEIGHT: 117 LBS | BODY MASS INDEX: 22.97 KG/M2 | HEIGHT: 60 IN

## 2021-06-01 VITALS — BODY MASS INDEX: 23.04 KG/M2 | WEIGHT: 117 LBS

## 2021-06-01 VITALS — WEIGHT: 117 LBS | BODY MASS INDEX: 23.04 KG/M2

## 2021-06-02 VITALS — WEIGHT: 103 LBS | HEIGHT: 60 IN | BODY MASS INDEX: 20.22 KG/M2

## 2021-06-02 VITALS — BODY MASS INDEX: 20.22 KG/M2 | WEIGHT: 103 LBS | HEIGHT: 60 IN

## 2021-06-02 NOTE — TELEPHONE ENCOUNTER
Refill Approved    Rx renewed per Medication Renewal Policy. Medication was last renewed on 10.9.19.    Dee Dee Mckeon, [facility] Triage/Med Refill 10/21/2019     Requested Prescriptions   Pending Prescriptions Disp Refills     citalopram (CELEXA) 40 MG tablet [Pharmacy Med Name: CITALOPRAM HBR 40 MG TABLET] 30 tablet 11     Sig: TAKE 1 TABLET BY MOUTH EVERY DAY       [facility] Refill Protocol  Passed - 10/21/2019  1:40 AM        Passed - PCP or prescribing provider visit in last year     Last office visit with prescriber/PCP: 5/17/2019 Surekha Contreras MD OR same dept: 5/17/2019 Surekha Contreras MD OR same specialty: 5/17/2019 Surekha Contreras MD  Last physical: 10/9/2019 Last MTM visit: Visit date not found   Next visit within 3 mo: Visit date not found  Next physical within 3 mo: Visit date not found  Prescriber OR PCP: Surekha Contreras MD  Last diagnosis associated with med order: There are no diagnoses linked to this encounter.  If protocol passes may refill for 12 months if within 3 months of last provider visit (or a total of 15 months).

## 2021-06-02 NOTE — TELEPHONE ENCOUNTER
I would advise that she request getting on the cancellation list; I struggle to feel that having her get in more emergently is indicated since this is a chronic problem.

## 2021-06-02 NOTE — PROGRESS NOTES
Assessment/Plan:      Healthy female exam.   1. Routine general medical examination at a health care facility  The patient sees a gynecologist for an annual exam and has updated her Pap smear with them.  She is up-to-date on a colonoscopy as well as mammogram as well.  Her immunizations are up-to-date and we discussed bone health including calcium and vitamin D intake.  The patient exercises on a regular basis and adheres to a very healthy diet.  - Lipid Profile    2. Bloating  I do think it would be worth a second opinion and a referral was placed to the [facility] gastroenterology department to see if there is anything else that she might be able to do to get some symptom relief.  - Ambulatory referral to Gastroenterology    3. Slow transit constipation  - Ambulatory referral to Gastroenterology    4. Generalized anxiety disorder  We had a good conversation as it relates to her irritability and I do think a medication change would be a good idea.  I recommended a gradual weaning of her Celexa and then discontinuing that and starting on venlafaxine.  I am to start a low-dose and after about a week increase to 2 capsules daily.  I asked her to follow-up in a month after initiating the new medication the patient was comfortable with that plan.  - venlafaxine (EFFEXOR XR) 37.5 MG 24 hr capsule; Take 2 capsules (75 mg total) by mouth daily.  Dispense: 60 capsule; Refill: 1  - Thyroid Cascade             Subjective:      Keena Vee is a 49 y.o. female who presents for an annual exam.  The patient has one concern she would like to discuss in more detail today and that regarding irritability.  The patient has a long-standing history of generalized anxiety disorder and currently is on Celexa 40 mg daily.  She has been on that medication for quite some time.  She was not sure it was working that well and we did give a trial to Lexapro about a year and a half ago but she had significant side effects feel  right and went back to her Celexa.  The patient wonders if it could be related to menopause or something else going on but reports that she has been feeling short tempered and irritable at times that are very unusual for her.  She mentions when she is in a car that she feels a lot more impatient with people around her.  She also feels a lot more on edge her irritable at times.  When asked about depression, she denies that she really has any symptoms that she would consider to be consistent with low mood or depressed mood.  She continues to get her menstrual cycle monthly.  The patient has been struggling a bit with GI symptoms including bloating and constipation.  She was given a trial of a couple of medications by the provider who she was seeing including Linzess but had significant side effects and the other medication she just did not feel comfortable trying based on the potential side effects.  She wonders what else might be an option.    Healthy Habits:   Regular Exercise: Yes  Sunscreen Use: Yes  Healthy Diet: Yes  Dental Visits Regularly: Yes  Seat Belt: Yes  Sexually active: Yes  Colonoscopy: Yes  Lipid Profile: Yes  Glucose Screen: N/A  Prevention of Osteoporosis: Yes  Last Dexa: N/A        Immunization History   Administered Date(s) Administered     DT (pediatric) 12/16/2005     Hep A, Adult IM (19yr & older) 02/28/2012     Hep A, historic 02/28/2012     INFLUENZA,RECOMBINANT,INJ,PF QUADRIVALENT 18+YRS 10/09/2019     Influenza, Seasonal, Inj PF IIV3 10/07/2010, 11/04/2011     Influenza, inj, historic,unspecified 10/03/2008, 11/17/2012     Influenza,seasonal quad, PF 10/21/2013, 11/09/2014     Influenza,seasonal quad, PF, =/> 6months 10/21/2013, 11/09/2014, 11/02/2015, 09/17/2018     Influenza,seasonal, Inj IIV3 11/18/2006, 10/03/2008, 11/17/2012     Influenza,seasonal,quad inj =/> 6months 12/12/2017     Td, adult adsorbed, PF 12/16/2005, 10/09/2019     Td,adult,historic,unspecified 12/16/2005     Tdap  10/02/2009     Immunization status: due today.    Gynecologic History  No LMP recorded.  Contraception: vasectomy  Last Pap: . Results were: normal; but with + HPV  Last mammogram: . Results were: normal      OB History    Para Term  AB Living   3 3 3         SAB TAB Ectopic Multiple Live Births                  # Outcome Date GA Lbr Adrian/2nd Weight Sex Delivery Anes PTL Lv   3 Term            2 Term            1 Term                Current Outpatient Medications   Medication Sig Dispense Refill     biotin 5 mg Tab        cholecalciferol, vitamin D3, (VITAMIN D3) 4,000 unit cap Take 2,000 mg by mouth daily.              citalopram (CELEXA) 40 MG tablet Take 1 tablet (40 mg total) by mouth daily. 90 tablet 3     senna (SENOKOT) 8.6 mg tablet Take 2 tablets by mouth.       traZODone (DESYREL) 50 MG tablet TAKE 1 TABLET BY MOUTH EVERYDAY AT BEDTIME 90 tablet 3     vit B comp no.3-folic-C-biotin (NEPHRO-CHEIKH) 1- mg-mg-mcg Tab tablet Take 1 tablet by mouth daily.       venlafaxine (EFFEXOR XR) 37.5 MG 24 hr capsule Take 2 capsules (75 mg total) by mouth daily. 60 capsule 1     No current facility-administered medications for this visit.      Past Medical History:   Diagnosis Date     Basal cell cancer      Past Surgical History:   Procedure Laterality Date     MOHS SURGERY Right 2009    right side of face near eye     TX REMOVAL ADENOIDS,PRIMARY,<11 Y/O      Description: Adenoidectomy;  Recorded: 10/02/2009;  Comments: for recurrent OM     TX REMOVAL OF TONSILS,<11 Y/O      Description: Tonsillectomy;  Recorded: 10/02/2009;  Comments: for strep     UMBILICAL HERNIA REPAIR       Latex  Family History   Problem Relation Age of Onset     Lung cancer Mother 59     Skin cancer Mother         Mid 50's for age     COPD Mother      Hypertension Mother      Obesity Mother      Lung cancer Maternal Grandfather 70     Heart failure Father      Basal cell carcinoma Father      Hypertension Father       Diabetes type II Brother      Anxiety disorder Son      Anxiety disorder Son      Congenital heart disease Son      Anxiety disorder Daughter      Social History     Socioeconomic History     Marital status:      Spouse name: Not on file     Number of children: Not on file     Years of education: Not on file     Highest education level: Not on file   Occupational History     Not on file   Social Needs     Financial resource strain: Not on file     Food insecurity:     Worry: Not on file     Inability: Not on file     Transportation needs:     Medical: Not on file     Non-medical: Not on file   Tobacco Use     Smoking status: Never Smoker     Smokeless tobacco: Never Used   Substance and Sexual Activity     Alcohol use: Yes     Comment: Rarely     Drug use: No     Sexual activity: Yes     Partners: Male   Lifestyle     Physical activity:     Days per week: Not on file     Minutes per session: Not on file     Stress: Not on file   Relationships     Social connections:     Talks on phone: Not on file     Gets together: Not on file     Attends L[employer]-day service: Not on file     Active member of club or organization: Not on file     Attends meetings of clubs or organizations: Not on file     Relationship status: Not on file     Intimate partner violence:     Fear of current or ex partner: Not on file     Emotionally abused: Not on file     Physically abused: Not on file     Forced sexual activity: Not on file   Other Topics Concern     Not on file   Social History Narrative     Not on file       Review of Systems  General:  Denies problem  Eyes: Denies problem  Ears/Nose/Throat: Denies problem  Cardiovascular: Denies problem  Respiratory:  Denies problem  Gastrointestinal:  Denies problem, Genitourinary: Denies problem  Musculoskeletal:  Denies problem  Skin: Denies problem  Neurologic: Denies problem  Psychiatric: Denies problem  Endocrine: Denies problem  Heme/Lymphatic: Denies problem   Allergic/Immunologic:  "Denies problem        Objective:         Vitals:    10/09/19 1605   BP: 100/60   Weight: 102 lb 9 oz (46.5 kg)   Height: 4' 11.5\" (1.511 m)     Body mass index is 20.37 kg/m .    Physical Exam:  General Appearance: Alert, cooperative, no distress, appears stated age  Head: Normocephalic, without obvious abnormality, atraumatic  Eyes: PERRL, conjunctiva/corneas clear, EOM's intact  Ears: Normal TM's and external ear canals, both ears  Nose: Nares normal, septum midline,mucosa normal, no drainage  Throat: Lips, mucosa, and tongue normal; teeth and gums normal  Neck: Supple, symmetrical, trachea midline, no adenopathy;  thyroid: not enlarged, symmetric, no tenderness/mass/nodules; no carotid bruit or JVD  Back: Symmetric, no curvature, ROM normal, no CVA tenderness  Lungs: Clear to auscultation bilaterally, respirations unlaboredHeart: Regular rate and rhythm, S1 and S2 normal, no murmur, rub, or gallop, Abdomen: Soft, non-tender, bowel sounds active all four quadrants,  no masses, no organomegaly  Pelvic:Not examined  Extremities: Extremities normal, atraumatic, no cyanosis or edema  Skin: Skin color, texture, turgor normal, no rashes or lesions  Lymph nodes: Cervical, supraclavicular, and axillary nodes normal  Neurologic: Normal        "

## 2021-06-03 VITALS
SYSTOLIC BLOOD PRESSURE: 108 MMHG | HEART RATE: 70 BPM | DIASTOLIC BLOOD PRESSURE: 68 MMHG | WEIGHT: 105 LBS | BODY MASS INDEX: 20.85 KG/M2

## 2021-06-03 VITALS
BODY MASS INDEX: 20.14 KG/M2 | SYSTOLIC BLOOD PRESSURE: 100 MMHG | DIASTOLIC BLOOD PRESSURE: 60 MMHG | HEIGHT: 60 IN | WEIGHT: 102.56 LBS

## 2021-06-03 NOTE — TELEPHONE ENCOUNTER
Refill Approved    Rx renewed per Medication Renewal Policy. Medication was last renewed on 11/3/2018.  Last OV 10/9/2019.    Kristi Riddle, [facility] Triage/Med Refill 11/4/2019     Requested Prescriptions   Pending Prescriptions Disp Refills     traZODone (DESYREL) 50 MG tablet [Pharmacy Med Name: TRAZODONE 50 MG TABLET] 30 tablet 11     Sig: TAKE 1 TABLET BY MOUTH EVERYDAY AT BEDTIME       Tricyclics/Misc Antidepressant/Antianxiety Meds Refill Protocol Passed - 11/4/2019  2:10 AM        Passed - PCP or prescribing provider visit in last year     Last office visit with prescriber/PCP: 5/17/2019 Surekha Contreras MD OR same dept: 5/17/2019 Surekha Contreras MD OR same specialty: 5/17/2019 Surekha Contreras MD  Last physical: 10/9/2019 Last MTM visit: Visit date not found   Next visit within 3 mo: Visit date not found  Next physical within 3 mo: Visit date not found  Prescriber OR PCP: Surekha Contreras MD  Last diagnosis associated with med order: 1. Generalized anxiety disorder  - traZODone (DESYREL) 50 MG tablet [Pharmacy Med Name: TRAZODONE 50 MG TABLET]; TAKE 1 TABLET BY MOUTH EVERYDAY AT BEDTIME  Dispense: 30 tablet; Refill: 11    If protocol passes may refill for 12 months if within 3 months of last provider visit (or a total of 15 months).

## 2021-06-03 NOTE — TELEPHONE ENCOUNTER
New Appointment Needed  What is the reason for the visit:    med check  Provider Preference: PCP only  How soon do you need to be seen?: as soon as possible. Gave PT several options but she is limited on days and times. She is scheduled for 12/2 but I don't think she wants to keep that appointment  Waitlist offered?: No  Okay to leave a detailed message:  Yes

## 2021-06-04 VITALS
SYSTOLIC BLOOD PRESSURE: 120 MMHG | BODY MASS INDEX: 20.51 KG/M2 | HEART RATE: 76 BPM | WEIGHT: 105 LBS | DIASTOLIC BLOOD PRESSURE: 64 MMHG

## 2021-06-04 NOTE — PROGRESS NOTES
"Assessment:     1. Pain in the coccyx  XR Sacrum and Coccyx 2 or More VWS   2. Acute non-recurrent frontal sinusitis  amoxicillin-clavulanate (AUGMENTIN) 875-125 mg per tablet   3. Generalized anxiety disorder  venlafaxine (EFFEXOR XR) 75 MG 24 hr capsule       Plan:     Keena is a 49-year-old presenting today regarding 3 separate issues.  In regards to her anxiety disorder, a decision was made to continue on Effexor XR 75 mg daily and a prescription was provided.  We will plan to follow-up regarding the medication in about 6 months.  I prescribed Augmentin twice daily for 10 days.  Lastly, the patient was reassured regarding her negative x-ray today and recommendations were given regarding pain management as well as getting a \"doughnut\" to sit on.    Subjective:       49 y.o. female presents for evaluation of a couple of concerns.  First, she scheduled this as a routine follow-up after change in medication for her generalized anxiety disorder.  The patient had been on Celexa for a few years but reported significant irritability on that medication recently.  I discontinued this and started her on Effexor XR and she is currently on the 75 mg dose.  She comes in today stating that she definitely feels less irritable and much calm her on this medication.  Overall she is tolerating it well although does feel that her affect is a bit flatter on this medication.  However, she feels that the benefits supersede that side effect and at this time would like to continue on the medication.  The patient also feels she has a sinus infection.  She reports over 2 weeks ago coming down with a cold but in the past few days she has had focal sinus pain and pressure, thick discolored nasal drainage in particular postnasally as well as a cough.  She has felt some low-grade fevers as well.  Lastly, the patient states that earlier today she slipped on some ice and fell directly onto her tailbone.  She describes moderate to significant " pain in her tailbone area and it hurts to sit and also to move in certain ways.  She is concerned about whether she could have fractured her tailbone.      Reviewed: The following portions of the patient's history were reviewed and updated as appropriate: allergies, current medications, past family history, past medical history, past social history, past surgical history and problem list.    Review of Systems  Pertinent items are noted in HPI.        Objective:     /68 (Patient Site: Left Arm, Patient Position: Sitting, Cuff Size: Adult Regular)   Pulse 70   Wt 105 lb (47.6 kg)   BMI 20.85 kg/m    General appearance: alert, appears stated age and cooperative  Ears: normal TM's and external ear canals both ears  Throat: lips, mucosa, and tongue normal; teeth and gums normal  Back: no bruising over tailbone area; tender to palpation at the tip of the coccyx.  Lungs: clear to auscultation bilaterally  Heart: regular rate and rhythm, S1, S2 normal, no murmur, click, rub or gallop    Xray Sacrum/Coccyx: negative for fracture    This note has been dictated using voice recognition software. Any grammatical or context distortions are unintentional and inherent to the software

## 2021-06-04 NOTE — TELEPHONE ENCOUNTER
RN cannot approve Refill Request    RN can NOT refill this medication PCP messaged that patient is overdue for Labs. Last office visit: 5/17/2019 Surekha Contreras MD Last Physical: 10/9/2019 Last MTM visit: Visit date not found Last visit same specialty: 5/17/2019 Surekha Contreras MD.  Next visit within 3 mo: Visit date not found  Next physical within 3 mo: Visit date not found      Goldie Cintron, [facility] Triage/Med Refill 12/9/2019    Requested Prescriptions   Pending Prescriptions Disp Refills     venlafaxine (EFFEXOR-XR) 37.5 MG 24 hr capsule [Pharmacy Med Name: VENLAFAXINE HCL ER 37.5 MG CAP] 60 capsule 1     Sig: TAKE 2 CAPSULES (75 MG TOTAL) BY MOUTH DAILY.       Venlafaxine/Desvenlafaxine Refill Protocol Failed - 12/9/2019  2:42 AM        Failed - LFT or AST or ALT in last year     Albumin   Date Value Ref Range Status   01/06/2017 4.1 3.5 - 5.0 g/dL Final     Bilirubin, Total   Date Value Ref Range Status   01/06/2017 0.3 0.0 - 1.0 mg/dL Final     Alkaline Phosphatase   Date Value Ref Range Status   01/06/2017 62 45 - 120 U/L Final     AST   Date Value Ref Range Status   01/06/2017 38 0 - 40 U/L Final     ALT   Date Value Ref Range Status   01/06/2017 36 0 - 45 U/L Final     Protein, Total   Date Value Ref Range Status   01/06/2017 6.9 6.0 - 8.0 g/dL Final                Passed - Fasting lipid cascade in last year     Cholesterol   Date Value Ref Range Status   10/09/2019 155 <=199 mg/dL Final     Triglycerides   Date Value Ref Range Status   10/09/2019 39 <=149 mg/dL Final     HDL Cholesterol   Date Value Ref Range Status   10/09/2019 88 >=50 mg/dL Final     LDL Calculated   Date Value Ref Range Status   10/09/2019 59 <=129 mg/dL Final     Patient Fasting > 8hrs?   Date Value Ref Range Status   10/09/2019 Unknown  Final             Passed - PCP or prescribing provider visit in last year     Last office visit with prescriber/PCP: 5/17/2019 Surekha Contreras MD OR same dept: 5/17/2019  Surekha Contreras MD OR same specialty: 5/17/2019 Surekha Contreras MD  Last physical: 10/9/2019 Last MTM visit: Visit date not found   Next visit within 3 mo: Visit date not found  Next physical within 3 mo: Visit date not found  Prescriber OR PCP: Surekha Contreras MD  Last diagnosis associated with med order: 1. Generalized anxiety disorder  - venlafaxine (EFFEXOR-XR) 37.5 MG 24 hr capsule [Pharmacy Med Name: VENLAFAXINE HCL ER 37.5 MG CAP]; Take 2 capsules (75 mg total) by mouth daily.  Dispense: 60 capsule; Refill: 1    If protocol passes may refill for 12 months if within 3 months of last provider visit (or a total of 15 months).             Passed - Blood Pressure in last year     BP Readings from Last 1 Encounters:   10/09/19 100/60

## 2021-06-05 VITALS
BODY MASS INDEX: 20.51 KG/M2 | OXYGEN SATURATION: 98 % | DIASTOLIC BLOOD PRESSURE: 70 MMHG | HEART RATE: 81 BPM | SYSTOLIC BLOOD PRESSURE: 120 MMHG | WEIGHT: 105 LBS

## 2021-06-05 VITALS
OXYGEN SATURATION: 99 % | HEART RATE: 92 BPM | SYSTOLIC BLOOD PRESSURE: 118 MMHG | BODY MASS INDEX: 20.51 KG/M2 | WEIGHT: 105 LBS | DIASTOLIC BLOOD PRESSURE: 72 MMHG

## 2021-06-07 NOTE — TELEPHONE ENCOUNTER
RN cannot approve Refill Request    RN can NOT refill this medication PCP messaged that patient is overdue for Labs. Last office visit: Visit date not found Last Physical: Visit date not found Last MTM visit: Visit date not found Last visit same specialty: 12/12/2019 Surekha Contreras MD.  Next visit within 3 mo: Visit date not found  Next physical within 3 mo: Visit date not found      Dee Dee Mckeon, [facility] Triage/Med Refill 4/29/2020    Requested Prescriptions   Pending Prescriptions Disp Refills     venlafaxine (EFFEXOR-XR) 37.5 MG 24 hr capsule [Pharmacy Med Name: VENLAFAXINE HCL ER 37.5 MG CAP] 60 capsule 1     Sig: TAKE 2 CAPSULES (75 MG TOTAL) BY MOUTH DAILY.       Venlafaxine/Desvenlafaxine Refill Protocol Failed - 4/28/2020 12:22 AM        Failed - LFT or AST or ALT in last year     Albumin   Date Value Ref Range Status   01/06/2017 4.1 3.5 - 5.0 g/dL Final     Bilirubin, Total   Date Value Ref Range Status   01/06/2017 0.3 0.0 - 1.0 mg/dL Final     Alkaline Phosphatase   Date Value Ref Range Status   01/06/2017 62 45 - 120 U/L Final     AST   Date Value Ref Range Status   01/06/2017 38 0 - 40 U/L Final     ALT   Date Value Ref Range Status   01/06/2017 36 0 - 45 U/L Final     Protein, Total   Date Value Ref Range Status   01/06/2017 6.9 6.0 - 8.0 g/dL Final                Passed - Fasting lipid cascade in last year     Cholesterol   Date Value Ref Range Status   10/09/2019 155 <=199 mg/dL Final     Triglycerides   Date Value Ref Range Status   10/09/2019 39 <=149 mg/dL Final     HDL Cholesterol   Date Value Ref Range Status   10/09/2019 88 >=50 mg/dL Final     LDL Calculated   Date Value Ref Range Status   10/09/2019 59 <=129 mg/dL Final     Patient Fasting > 8hrs?   Date Value Ref Range Status   10/09/2019 Unknown  Final             Passed - PCP or prescribing provider visit in last year     Last office visit with prescriber/PCP: Visit date not found OR same dept: 12/12/2019 Ben  Surekha FRANCO MD OR same specialty: 12/12/2019 Surekha Contreras MD  Last physical: Visit date not found Last MTM visit: Visit date not found   Next visit within 3 mo: Visit date not found  Next physical within 3 mo: Visit date not found  Prescriber OR PCP: Willis Velez DO  Last diagnosis associated with med order: 1. Generalized anxiety disorder  - venlafaxine (EFFEXOR-XR) 37.5 MG 24 hr capsule [Pharmacy Med Name: VENLAFAXINE HCL ER 37.5 MG CAP]; TAKE 2 CAPSULES (75 MG TOTAL) BY MOUTH DAILY.  Dispense: 60 capsule; Refill: 1    If protocol passes may refill for 12 months if within 3 months of last provider visit (or a total of 15 months).             Passed - Blood Pressure in last year     BP Readings from Last 1 Encounters:   12/12/19 108/68

## 2021-06-08 NOTE — PROGRESS NOTES
"Keena is a 46 y.o. female presenting to the clinic for concerns for abdominal pain.  Patient states over the past 2 days she has felt bloated.  She had a fever yesterday.  Complains of a dull ache within both sides of her lower abdomen.  She has been taking Advil as needed.  She also complains of left flank pain.  She has had nausea without vomiting.  Her last bowel movement was last night and small.  She is consuming a paleo diet and since she started this diet, she has had frequent \"pungent \"diarrhea.  Her last menstrual was 1 week ago.  Her periods are regular once per month.  Her  had a vasectomy.  She denies dysuria, hematuria, urinary urgency, and urinary frequency.  She has been drinking Kombucha tea. Her  is present and they have no concerns for STDS.     Review of Systems: A complete 14 point review of systems was obtained and is negative or as stated in the history of present illness.    Social History     Social History     Marital status:      Spouse name: N/A     Number of children: N/A     Years of education: N/A     Occupational History     Not on file.     Social History Main Topics     Smoking status: Not on file     Smokeless tobacco: Not on file     Alcohol use Not on file     Drug use: Not on file     Sexual activity: Not on file     Other Topics Concern     Not on file     Social History Narrative       Active Ambulatory Problems     Diagnosis Date Noted     Anxiety      Dermatitis      Skin: A Rash      Lump In / On The Skin      Tendonitis      Limb Weakness Temporary      Hearing Loss      Generalized Anxiety Disorder      Resolved Ambulatory Problems     Diagnosis Date Noted     No Resolved Ambulatory Problems     Past Medical History   Diagnosis Date     Basal cell cancer 2009       Family History   Problem Relation Age of Onset     Lung cancer Mother 59     Skin cancer Mother      Mid 50's for age     Lung cancer Maternal Grandfather 70       OBJECTIVE:     Visit " Vitals     /62 (Patient Site: Right Arm, Patient Position: Sitting, Cuff Size: Adult Regular)     Pulse 65     Temp 98.1  F (36.7  C)     Wt 115 lb 12.8 oz (52.5 kg)     SpO2 98%       Patient is alert, in no obvious distress.   Skin: Warm, dry.  No lesions or rashes.  Skin turgor rapid return.   HEENT:  Head normocephalic, atraumatic.  Eyes normal. . Ears normal.  Nose patent, mucosa pink.  Oropharynx mucosa pink.  No lesions or tonsillar enlargement.   Neck: Supple, no lymphadenopathy.   Lungs:  Clear to auscultation. Respirations even and unlabored.  No wheezing or rales noted.   Heart:  Regular rate and rhythm.  No murmurs.   Abdomen: Soft, tender to palpation suprapubic.  No organomegaly. Bowel sounds normoactive. No guarding or masses noted. .      LABORATORY: hemogram showed a WBC of 3.1.  Urinalysis showed ketones and trace blood.      ASSESSMENT AND PLAN:     1. Abdominal pain  Differentials include appendicitis, diverticulitis, nephrolithiasis.  Will obtain CT scan for further evaluation.  Provided prescription for Percocet to be used as needed.  We discussed the Paleo diet and how she is excreting ketones in her urine.  Recommend avoidance of the Kombucha tea which can cause irritable bowel symptoms.  If CT scan is normal, we'll treat for possible urinary tract infection with Bactrim DS.  If symptoms persist or worsen over the weekend, suggesting ER evaluation.  She is content with the plan.  - Urinalysis-UC if Indicated  - HM2(CBC w/o Differential)  - Urine,Microscopic  - Urinalysis-UC if Indicated  - Culture, Urine  - CT Abdomen Pelvis With Oral With Without IV Contrast; Future  - Comprehensive Metabolic Panel  - Lipase  - sulfamethoxazole-trimethoprim (BACTRIM DS) 800-160 mg per tablet; Take 1 tablet by mouth 2 (two) times a day for 3 days.  Dispense: 6 tablet; Refill: 0  - oxyCODONE-acetaminophen (PERCOCET) 5-325 mg per tablet; Take 1-2 tablets by mouth every 6 (six) hours as needed for pain.   Dispense: 20 tablet; Refill: 0

## 2021-06-08 NOTE — PROGRESS NOTES
"Keena Vee is a 50 y.o. female who is being evaluated via a billable video visit.      The patient has been notified of following:     \"This video visit will be conducted via a call between you and your physician/provider. We have found that certain health care needs can be provided without the need for an in-person physical exam.  This service lets us provide the care you need with a video conversation.  If a prescription is necessary we can send it directly to your pharmacy.  If lab work is needed we can place an order for that and you can then stop by our lab to have the test done at a later time.    Video visits are billed at different rates depending on your insurance coverage. Please reach out to your insurance provider with any questions.    If during the course of the call the physician/provider feels a video visit is not appropriate, you will not be charged for this service.\"    Patient has given verbal consent to a Video visit? Yes    Please contact: [phone]     Will anyone else be joining your video visit? No        Video Start Time: 3:22    Additional provider notes: Keena is a 50-year-old female presenting today for a follow-up regarding generalized anxiety disorder.  The patient was advised to increase her Effexor extended release by an additional 37.5 mg which currently places her at 112.5 mg dose.  She reports a definite improvement in her anxiety and describes it as very manageable.  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  This is also been a significant relief for her.  She is currently in the process of looking for her next career path.  She is tolerating the medication adjustment well.  She does have questions regarding immunizations and specifically is wondering if she needs any at this time.     GENERAL: Healthy, alert and no distress  EYES: Eyes grossly normal to inspection. No discharge or " erythema, or obvious scleral/conjunctival abnormalities.  PSYCH: Mentation appears normal, affect normal/bright, judgement and insight intact, normal speech and appearance well-groomed    Problem List Items Addressed This Visit     Generalized Anxiety Disorder     The patient is currently on 112.5 mg dosage and is doing well.  I refilled the patient's 37.5 mg capsule and provided her with a 90-day supply.         Relevant Medications    venlafaxine (EFFEXOR XR) 37.5 MG 24 hr capsule        Advised patient that she is due for a mammogram and orders placed for the above vaccines.     Video-Visit Details    Type of service:  Video Visit    Video End Time (time video stopped): 3:37 PM  Originating Location (pt. Location): Home    Distant Location (provider location):  [facility]     Platform used for Video Visit: Seb Contreras MD

## 2021-06-09 NOTE — PROGRESS NOTES
"Chief complaint: Sinus pressure pain    History of present illness: This is a pleasant 47-year-old woman who is here today for evaluation of sinus pressure and pain.  She states that last fall she started to notice an itchy, watery eyes, runny nose and drainage in the back of her throat as well as some pain over the bridge of her nose.  She was using Allegra regularly as well as fluticasone nasal spray.  She thought perhaps fluticasone was making her worse so she stopped and noticed no difference in symptoms.  Of note, she stopped her Allegra 60s prior to our visit and has noted no worsening in her symptoms.  She's had no cough, wheeze or shortness of breath.  She has never been allergy tested.    Past medical history: Abdominal surgery, history of exercise-induced bronchospasm    Social history: She was at home was built in 1991, works as a , has 1 dog and 1 cat, no mold or water damage, intermittent carpeting, nonsmoker    Family history: Family members with allergies    Review of Systems performed as above and the remainder is negative.        Current Outpatient Prescriptions:      citalopram (CELEXA) 20 MG tablet, TAKE 1 & 1/2 TABLETS BY MOUTH ONCE A DAY., Disp: 135 tablet, Rfl: 2     citalopram (CELEXA) 20 MG tablet, TAKE 1 & 1/2 TABLETS BY MOUTH ONCE A DAY., Disp: 135 tablet, Rfl: 3     fexofenadine (ALLEGRA) 60 MG tablet, Take 60 mg by mouth bedtime as needed., Disp: , Rfl:      pseudoephedrine-guaifenesin (MUCINEX D)  mg per tablet, Take 1 tablet by mouth every 12 (twelve) hours., Disp: , Rfl:     No Known Allergies    Visit Vitals     /62     Pulse 60     Ht 4' 11.75\" (1.518 m)     Wt 120 lb (54.4 kg)     LMP 02/23/2017 (Exact Date)     BMI 23.63 kg/m2     Gen:  Pleasant female not in acute distress  HEENT:  Eyes no erythema of the bulbar or palpebral conjunctiva, no edema.  Ears:  TMs well visualized, no effusions.  Nose:  Clear, no congestion, normal mucosa Mouth:  Throat " clear, no lip or tongue edema.    Cardiac:  Regular rate and rhythm, no murmurs, rubs or gallops  Respiratory:  Clear to auscultation bilaterally, no adventitious breath sounds  Lymph:  No supraclavicular or cervical lymphadenopathy  Skin:  No rashes or lesions  Psych:  Alert and oriented times 3    Last Percutaneous Allergy Test Results  Trees  [place], White  1:20 H  (W/F in mm): 0-0 (03/07/17 1212)  Birch Mix 1:20 H (W/F in mm): 0-0 (03/07/17 1212)  [place], Common 1:20 H (W/F in mm): 0-0 (03/07/17 1212)  Elm, American 1:20 H (W/F in mm): 0-0 (03/07/17 1212)  [place], Shagbark 1:20 H (W/F in mm): 0-0 (03/07/17 1212)  Maple, Hard/Sugar 1:20 H (W/F in mm): 0-0 (03/07/17 1212)  [place] Mix 1:20 H (W/F in mm): 0-0 (03/07/17 1212)  Oak, Red 1:20 H (W/F in mm): 0-0 (03/07/17 1212)  [place], American 1:20 H (W/F in mm): 0-0 (03/07/17 1212)  [place] Tree 1:20 H (W/F in mm): 0-0 (03/07/17 1212)  Dust Mites  D. Pteronyssinus Mite 30,000 AU/ML H (W/F in mm): 0-0 (03/07/17 1212)  D. Farinae Mite 30,000 AU/ML H (W/F in mm: 0-0 (03/07/17 1212)  Grasses  Grass Mix #4 10,000 BAU/ML H: 0-0 (03/07/17 1212)  Abdulaziz Grass 1:20 H (W/F in mm): 0-0 (03/07/17 1212)  Cockroach  Cockroach Mix 1:10 H (W/F in mm): 0-0 (03/07/17 1212)  Molds/Fungi  Alternaria Tenuis 1:10 H (W/F in mm): 0-0 (03/07/17 1212)  Aspergillus Fumigatus 1:10 H (W/F in mm): 0-0 (03/07/17 1212)  Homodendrum Cladosporioides 1:10 H (W/F in mm): 0-0 (03/07/17 1212)  Penicillin Notatum 1:10 H (W/F in mm): 0-0 (03/07/17 1212)  Epicoccum 1:10 H (W/F in mm): 0-0 (03/07/17 1212)  Weeds  Ragweed, Short 1:20 H (W/F in mm): 0-0 (03/07/17 1212)  Dock, Sorrel 1:20 H (W/F in mm): 0-0 (03/07/17 1212)  Lamb's Quarter 1:20 H (W/F in mm): 0-0 (03/07/17 1212)  Pigweed, Rough Red Root 1:20 H  (W/F in mm): 0-0 (03/07/17 1212)  Plantain, English 1:20 H  (W/F in mm): 0-0 (03/07/17 1212)  Sagebrush, Mugwort 1:20 H  (W/F in mm): 0-0 (03/07/17 1212)  Animal  Cat 10,000 BAU/ML H (W/F in mm):  0-0 (03/07/17 1212)  Dog 1:10 H (W/F in mm): 0-0 (03/07/17 1212)  Controls  Device Type: QUINTIP (03/07/17 1212)  Neg. control: 50% Glycerine/Saline H (W/F in mm): 0-0 (03/07/17 1212)  Pos. control: Histamine 6mg/ML (W/F in mms): 5-15 (03/07/17 1212)    Impression report and plan:  1.  Nasal congestion    Allergy testing was negative.  She reports a lot of pain in her face, I am wondering if this could be due to TMJ dysfunction.  She has had quite extensive dental in orthodontia in the past. She would like to see ENT.  I will place this referral today.  I did ask her to try nasal rinses plus Nasacort nasal spray to see if this may improve her symptoms.  She should do this for at least one month.  I would stop the Allegra.  Follow as needed.

## 2021-06-09 NOTE — PROGRESS NOTES
Assessment:     1. Arm pain, right  predniSONE (DELTASONE) 20 MG tablet          Plan:     Unclear as to the etiology of her right arm pain.  Possible that she has a bit of a pinched nerve in her shoulder as a result of the manipulation of her shoulder about a week ago.  It does not seem to be muscular in etiology and has not been responsive to ibuprofen.  Will try her on a burst of prednisone and see if this helps.  Recommend she follow-up in a week or 2 if symptoms are not improving or getting worse in any way.    Subjective:       47 y.o. female presents for evaluation right arm pain.  She was having some shoulder difficulty and went to physical therapy.  They did some manipulation of her shoulder and following this started having worsening pain.  She has excellent range of motion shoulder but now has a dull ache that is not gone away down her arm to about her forearm.  She denies any numbness or tingling.  She feels her arm is constantly aching now but does not feel muscular.  No problems with strength.  She has tried ibuprofen 600 mg 3 times daily for the past week and this has not been helpful at all.  She denies any neck pain or problems with range of motion of her neck.  Movement of her shoulder, neck, or arm does not exacerbate her arm pain.    The following portions of the patient's history were reviewed and updated as appropriate: allergies, current medications, past family history, past medical history, past social history, past surgical history and problem list.    Review of Systems  A 12 point comprehensive review of systems was negative except as noted.     Objective:        Visit Vitals     /68 (Patient Site: Left Arm, Patient Position: Sitting, Cuff Size: Adult Regular)     Pulse 68     LMP 02/23/2017 (Exact Date)     Breastfeeding No     General appearance: alert, appears stated age and cooperative  Neck: supple, symmetrical, trachea midline and No vertebral tenderness or paraspinal muscle  tenderness.  Excellent range of motion of her neck.  Extremities: Excellent range of motion of her shoulder.  She does have some tenderness posteriorly in her shoulder as well as anteriorly over the musculature but has excellent range of motion with no difficulty with abduction or internal or external rotation.  She has no weakness.  No tenderness of the before meals joint.  No tenderness over the biceps tendon or musculature in her arm.  No reproduction of pain in her elbow or arm with movement.  Neurologic: Sensory: normal  Motor:normal Upper extremities bilaterally  Reflexes: 2+ and symmetric     This note has been dictated using voice recognition software. Any grammatical or context distortions are unintentional and inherent to the software

## 2021-06-09 NOTE — PROGRESS NOTES
HPI: This patient is a 46yo F who presents to the clinic for evaluation of facial pressure/headache and PND. She has a known history of TMJ with chronic popping of the right TMJ with chewing. She has had significant orthodontia work done and seen PT for this in the past. She has bilateral facial pressure, pressure behind the eyes, and temporal headaches frequently. Has been treated for sinus issues without any improvement. Also has some PND that has not responded to antihistamines or nasal steroid sprays. Denies otorrhea, hearing loss, tinnitus, vertigo, and other major symptoms such as fever, weight loss, odynophagia, dysphagia, and hemoptysis.     Past medical history, surgical history, social history, family history, medications, and allergies have been reviewed with the patient and are documented above.    Review of Systems: a 10-system review was performed. Pertinent positives are noted in the HPI and on a separate scanned document in the chart.    PHYSICAL EXAMINATION:  GEN: no acute distress, normocephalic  EYES: extraocular movements are intact, pupils are equal and round. Sclera clear.   EARS: auricles are normally formed. The external auditory canals are clear with minimal to no cerumen. Tympanic membranes are intact bilaterally with no signs of infection, effusion, retractions, or perforations.  NOSE: anterior nares are patent. There are no masses or lesions. The septum is non-obstructing. Turbinates are not enlarged or boggy  OC/OP: clear, dentition is in good repair but with flattening and wear facets. The tongue and palate are fully mobile and symmetric. The floor of mouth, base of tongue, and tonsils are soft and symmetric. + cobblestoning  HP/L (mirror): BOT, vallecula, epiglottis, and larynx clear. Mild hyperemia of the arytenoids and laryngeal surface of the epiglottis consistent with LPR. Vocal folds fully mobile and without lesion.  NECK: soft and supple. No lymphadenopathy or masses. Airway is  "midline. Tenderness of the bilateral TMJ.  NEURO: CN II-XII are intact bilaterally. alert and oriented x 3. No nystagmus. Gait is normal.  PULM: breathing comfortably on room air, normal chest expansion with respiration  HEART: regular rate and rhythm, no peripheral edema    MEDICAL DECISION-MAKING: This patient is a 46yo F with facial pressure and headache from TMD. Discussed that only 3% of people with \"sinus headache\" actually have any sinus pathology to explain headache and that the other 97% are headache or TMJ. Given her history, suggested she pursue a refresher on the PT/muscle aspect of this chronic issue. The PND is secondary to some mild LPR. Discussed diet and lifestyle modifications that can be very effective for this. .     "

## 2021-06-10 NOTE — PROGRESS NOTES
"Assessment/Plan:    Keena was seen today for questions for providers/results and referral.    Diagnoses and all orders for this visit:    Skin tag of perianal region: History of constipation.  Now doing better with dietary changes and lifestyle changes.  However, she has struggled with hygiene as result of what sounds like a skin tag in the perianal area.  She requests surgical removal.  I do not believe she is actually describing a hemorrhoid but given the struggles with hygiene I thought be reasonable for her to be evaluated and discussed with a surgeon.  -     Cancel: Ambulatory referral to Colorectal Surgery  -     Ambulatory referral to Colorectal Surgery    Pain of left thigh: Ongoing pain despite activity modification.  Now developing pain over the anserine bursa.  She is tried multiple modalities of physical therapy.  Given persistence of pain I recommended sports medicine evaluation.  Referral placed to Dr. Keena Del Real with [facility].  -     Ambulatory referral to Sports Medicine     Return in about 4 weeks (around 9/10/2020) for recheck if not improving.    Pepe Avalos MD  _______________________________    Chief Complaint   Patient presents with     Questions For Providers/results     pt c/o left hamstring pain     Referral     hemorroid removal      Subjective: Keena Vee is a 50 y.o. year old female who I have seen in clinic before who presents with the following acute complaint(s):    Hemorroid:   - \"I have an external hemorrhoid.\" hisotry of constipation.  She has had blood in stool.  Flesh colored. No pain.    Leg pain:   - long history of pain in the left leg.  She has done different types of physical therapies (Strength, stretching, improve alignment).  She has been using deep massage over the past 18 months.  She exercises a lot. \"Really aggravated. \"Just a strain.\" Work-outs include pilates.  She cannot run for other reasons.  Worse after long hike on the [facility].  \"Tipped and " "forward pelvis.\"  Known foot pain.  She had a surgery in the past through [facility].  She saw Patience Underwood.      ROS: Complete review of systems obtained.  Pertinent items are listed above.     The following portions of the patient's history were reviewed and updated as appropriate: allergies, current medications, past medical history and problem list.     Objective:   /64 (Patient Site: Left Arm, Patient Position: Sitting, Cuff Size: Adult Regular)   Pulse 76   Wt 105 lb (47.6 kg)   LMP 08/06/2020   Breastfeeding No   BMI 20.85 kg/m    Gen: No acute distress, pleasant.  Psych: Normal affect  MSK: Tenderness palpation over the anserine bursa of the left leg as well as the posterior thigh.  Normal range of motion.  Mildly antalgic gait.  Normal bulk.    No results found for this or any previous visit (from the past 24 hour(s)).  Mammo Screening Bilateral    Result Date: 7/7/2020  BILATERAL FULL FIELD DIGITAL SCREENING MAMMOGRAM WITH TOMOSYNTHESIS Performed on: 7/6/20 Compared to: 04/26/2019 Mammo Screening Bilateral, 03/21/2018 Mammo Screening Bilateral, 02/27/2017 Mammo Screening Bilateral, 02/18/2016 Mammo Screening Bilateral, and 01/20/2015 Mammo Screening Bilateral Findings: The breasts are heterogeneously dense, which may obscure small masses. There is no radiographic evidence of malignancy. This study was evaluated with the assistance of Computer-Aided Detection. Breast Tomosynthesis was used in interpretation. Repeat routine screening mammogram in one year is recommended. ACR BI-RADS Category 1: Negative      Additional History from Old Records Summarized (2): no  Decision to Obtain Records (1): no  Radiology Tests Summarized or Ordered (1): no  Labs Reviewed or Ordered (1): no  Medicine Test Summarized or Ordered (1): no  Independent Review of EKG or X-RAY(2 each): no    This note has been dictated using voice recognition software. Any grammatical or context distortions are unintentional and inherent to " the software

## 2021-06-10 NOTE — PATIENT INSTRUCTIONS - HE
Sports Medicine referal to Dr. Keena Fine with [facility].  - Call and schedule if they are willing to schedule you.

## 2021-06-11 ENCOUNTER — RECORDS - HEALTHEAST (OUTPATIENT)
Dept: SCHEDULING | Facility: CLINIC | Age: 51
End: 2021-06-11

## 2021-06-11 DIAGNOSIS — Z12.31 VISIT FOR SCREENING MAMMOGRAM: ICD-10-CM

## 2021-06-11 NOTE — PROGRESS NOTES
Assessment:     1. Left shoulder pain  MR Shoulder Without Contrast Left    MR Shoulder Without Contrast Left   2. Constipation  Ambulatory referral to Gastroenterology   3. DJD (degenerative joint disease)  CANCELED: XR Toe Right 2 or More VWS       Plan:     I recommended an MRI of her left shoulder as a next step since she has failed one year of conservative treatment. I referred the patient back to GI for a consultation regarding constipation. We discussed DJD and I suggested that she may do better if she modifies her Yoga as this is contributing to significant flexion at those joints that are bothering her.     Subjective:       47 y.o. female presents for evaluation of joint pains. The patient reports pains that have been going on for about one year. She has been also working with therapist for the past year with left shoulder pain. She has limited ROM due to the pain and is unable to abduct or extend her shoulder well. She has pain at night when she is sleeping. She also has some joint pains at the base of her toes right worse than left and the base of her right thumb. She also has issues longstanding with severe constipation for which she has had a colonoscopy which showed adhesions and redundant colon. She is wondering what else she can do besides Miralax.     The following portions of the patient's history were reviewed and updated as appropriate: allergies, current medications, past family history, past medical history, past social history, past surgical history and problem list.    Review of Systems  Pertinent items are noted in HPI.     History   Smoking Status     Never Smoker   Smokeless Tobacco     Never Used       Objective:      /62 (Patient Site: Left Arm, Patient Position: Sitting, Cuff Size: Adult Regular)  Pulse 68  Wt 116 lb (52.6 kg)  Breastfeeding? No  BMI 22.84 kg/m2  General appearance: alert, appears stated age and cooperative  Extremities: normal ROM about great toe metacarpal  phalangeal joint, however, there is mild tenderness.   Hand: tenderness at the carpal-metacarpal joint with normal ROM and no swelling seen.  Shoulder: limited ROM with extension and abduction       This note has been dictated using voice recognition software. Any grammatical or context distortions are unintentional and inherent to the software

## 2021-06-12 NOTE — TELEPHONE ENCOUNTER
Refill Approved    Rx renewed per Medication Renewal Policy. Medication was last renewed on 11/4/19.    Julia Trujillo, [facility] Connection Triage/Med Refill 10/26/2020     Requested Prescriptions   Pending Prescriptions Disp Refills     traZODone (DESYREL) 50 MG tablet [Pharmacy Med Name: TRAZODONE 50 MG TABLET] 30 tablet 11     Sig: TAKE 1 TABLET BY MOUTH EVERYDAY AT BEDTIME       Tricyclics/Misc Antidepressant/Antianxiety Meds Refill Protocol Passed - 10/24/2020  9:35 AM        Passed - PCP or prescribing provider visit in last year     Last office visit with prescriber/PCP: 12/12/2019 Surekha Contreras MD OR same dept: 8/13/2020 Pepe Avalos MD OR same specialty: 8/13/2020 Pepe Avalos MD  Last physical: 10/9/2019 Last MTM visit: Visit date not found   Next visit within 3 mo: Visit date not found  Next physical within 3 mo: Visit date not found  Prescriber OR PCP: Surekha Contreras MD  Last diagnosis associated with med order: 1. Generalized anxiety disorder  - traZODone (DESYREL) 50 MG tablet [Pharmacy Med Name: TRAZODONE 50 MG TABLET]; TAKE 1 TABLET BY MOUTH EVERYDAY AT BEDTIME  Dispense: 30 tablet; Refill: 11    If protocol passes may refill for 12 months if within 3 months of last provider visit (or a total of 15 months).

## 2021-06-13 NOTE — TELEPHONE ENCOUNTER
"Keena calls to report:  - palpitations  - mild shortness of breath  - headaches  - lightheadedness  - shakiness    Usual BP at 115/75, has noticed her BP has been 138/87.    These symptoms not present at the time of call.    COVID symptoms started 11/26. Tested positive for COVID on 12/1. Symptoms have improved since initially having infection.     FNA advised that she may have long term effects of COVID.   https://www.cdc.gov/coronavirus/2019-ncov/long-term-effects.html    PLAN:  - keep appointment on Monday 12/21  - monitor BP and pulse over the weekend.   - cut back on caffeine  - call back for further concerns.    Patient verbalized understanding.    Jesenia Russell RN/[place] Nurse Advisor      Reason for Disposition    Palpitations and no improvement after following Care Advice    Palpitations    Additional Information    Negative: Passed out (i.e., fainted, collapsed and was not responding)    Negative: Shock suspected (e.g., cold/pale/clammy skin, too weak to stand, low BP, rapid pulse)    Negative: Difficult to awaken or acting confused (e.g., disoriented, slurred speech)    Negative: Visible sweat on face or sweat dripping down face    Negative: Unable to walk, or can only walk with assistance (e.g., requires support)    Negative: Received SHOCK from implantable cardiac defibrillator and has persisting symptoms (i.e., palpitations, lightheadedness)    Negative: Sounds like a life-threatening emergency to the triager    Negative: Difficulty breathing    Negative: Dizziness, lightheadedness, or weakness    Negative: Heart beating very rapidly (e.g., > 140 / minute) and present now (EXCEPTION: during exercise)    Negative: Heart beating very slowly (e.g., < 50 / minute) (EXCEPTION: athlete)    Negative: New or worsened shortness of breath with activity (dyspnea on exertion)    Negative: Patient sounds very sick or weak to the triager    Negative: Wearing a \"holter monitor\" or \"cardiac event monitor\"    " Negative: Received SHOCK from implantable cardiac defibrillator (and now feels well)    Negative: Heart beating very rapidly (e.g., > 140 / minute) and not present now (EXCEPTION: during exercise)    Negative: Skipped or extra beat(s) and increases with exercise or exertion    Negative: Skipped or extra beat(s) and occurs 4 or more times per minute    Negative: History of heart disease (i.e., heart attack, bypass surgery, angina, angioplasty)    Negative: Age > 60 years    Negative: Taking water pill (i.e., diuretic) or heart medication (e.g., digoxin)    Negative: Patient wants to be seen    Negative: History of hyperthyroidism or taking thyroid medication    Negative: Known or suspected substance abuse (e.g., cocaine, alcohol abuse)    Protocols used: HEART RATE AND HEARTBEAT [id number]

## 2021-06-13 NOTE — PROGRESS NOTES
Assessment/Plan:     Problem List Items Addressed This Visit     None      Visit Diagnoses     Palpitations    -  Primary    Relevant Orders    Electrocardiogram Perform and Read (Completed)    Painful breathing        Relevant Orders    XR Chest 2 Views    History of 2019 novel coronavirus disease (COVID-19)            The patient had a normal EKG, heart and lung exam and chest x-ray today.  I explained that some of the symptoms that she is describing may be residual inflammation status post coronavirus infection.  I recommended continuing to monitor and reassured that likely the symptoms will improve over the next 4 to 6 weeks.  I do not think that further evaluation based on her history or exam today is indicated but would reassess if things change.    Subjective:       50 y.o. female presents for evaluation of irregular heartbeat that has been more common since coming down with Covid.  The patient was ill with the coronavirus recently and has some residual dry cough and occasional chest discomfort.  She reports that more prominently in the morning she will feel at times that her heart is beating more fast.  She has been able to check her pulse when this is occurring and states that it is always in the normal range of 60 to 100 bpm and seems to be regular.  She describes an occasional little sharp pain that occurs in her left chest which she worries about.  She is worried in part because of her family history for heart disease.      Reviewed: The following portions of the patient's history were reviewed and updated as appropriate: allergies, current medications, past family history, past medical history, past social history, past surgical history and problem list.    Review of Systems  Pertinent items are noted in HPI.        Objective:     /72 (Patient Site: Left Arm, Patient Position: Sitting, Cuff Size: Adult Regular)   Pulse 92   Wt 105 lb (47.6 kg)   SpO2 99%   BMI 20.85 kg/m    General appearance:  alert, appears stated age and cooperative  Lungs: clear to auscultation bilaterally  Heart: regular rate and rhythm, S1, S2 normal, no murmur, click, rub or gallop    Chest Xray reviewed personally and normal    This note has been dictated using voice recognition software. Any grammatical or context distortions are unintentional and inherent to the software

## 2021-06-15 PROBLEM — M79.601 ARM PAIN, RIGHT: Status: ACTIVE | Noted: 2017-03-09

## 2021-06-15 NOTE — TELEPHONE ENCOUNTER
RN cannot approve Refill Request    RN can NOT refill this medication Protocol failed and NO refill given. Last office visit: 12/21/2020 Surekha Contreras MD Last Physical: 10/9/2019 Last MTM visit: Visit date not found Last visit same specialty: 12/21/2020 Surekha Contreras MD.  Next visit within 3 mo: Visit date not found  Next physical within 3 mo: Visit date not found      [facility]t Brown, [facility] Triage/Med Refill 3/9/2021    Requested Prescriptions   Pending Prescriptions Disp Refills     venlafaxine (EFFEXOR-XR) 37.5 MG 24 hr capsule [Pharmacy Med Name: VENLAFAXINE HCL ER 37.5 MG CAP] 30 capsule 11     Sig: TAKE 1 CAPSULE BY MOUTH ONCE DAILY ALONG WITH 75MG       Venlafaxine/Desvenlafaxine Refill Protocol Failed - 3/8/2021 12:15 PM        Failed - LFT or AST or ALT in last year     Albumin   Date Value Ref Range Status   01/06/2017 4.1 3.5 - 5.0 g/dL Final     Bilirubin, Total   Date Value Ref Range Status   01/06/2017 0.3 0.0 - 1.0 mg/dL Final     Alkaline Phosphatase   Date Value Ref Range Status   01/06/2017 62 45 - 120 U/L Final     AST   Date Value Ref Range Status   01/06/2017 38 0 - 40 U/L Final     ALT   Date Value Ref Range Status   01/06/2017 36 0 - 45 U/L Final     Protein, Total   Date Value Ref Range Status   01/06/2017 6.9 6.0 - 8.0 g/dL Final                Failed - Fasting lipid cascade in last year     Cholesterol   Date Value Ref Range Status   10/09/2019 155 <=199 mg/dL Final     Triglycerides   Date Value Ref Range Status   10/09/2019 39 <=149 mg/dL Final     HDL Cholesterol   Date Value Ref Range Status   10/09/2019 88 >=50 mg/dL Final     LDL Calculated   Date Value Ref Range Status   10/09/2019 59 <=129 mg/dL Final     Patient Fasting > 8hrs?   Date Value Ref Range Status   10/09/2019 Unknown  Final             Passed - PCP or prescribing provider visit in last year     Last office visit with prescriber/PCP: 12/21/2020 Surekha Contreras MD OR same dept: Visit date not  found OR same specialty: 12/21/2020 Surekha Contreras MD  Last physical: 10/9/2019 Last MTM visit: Visit date not found   Next visit within 3 mo: Visit date not found  Next physical within 3 mo: Visit date not found  Prescriber OR PCP: Surekha Contreras MD  Last diagnosis associated with med order: 1. Generalized anxiety disorder  - venlafaxine (EFFEXOR-XR) 37.5 MG 24 hr capsule [Pharmacy Med Name: VENLAFAXINE HCL ER 37.5 MG CAP]; TAKE 1 CAPSULE BY MOUTH ONCE DAILY ALONG WITH 75MG  Dispense: 30 capsule; Refill: 11    If protocol passes may refill for 12 months if within 3 months of last provider visit (or a total of 15 months).             Passed - Blood Pressure in last year     BP Readings from Last 1 Encounters:   12/21/20 118/72

## 2021-06-15 NOTE — TELEPHONE ENCOUNTER
RN cannot approve Refill Request    RN can NOT refill this medication Protocol failed and NO refill given. Last office visit: Visit date not found Last Physical: Visit date not found Last MTM visit: Visit date not found Last visit same specialty: 12/21/2020 Surekha Contreras MD.  Next visit within 3 mo: Visit date not found  Next physical within 3 mo: Visit date not found      Colt Brown, [facility] Triage/Med Refill 3/9/2021    Requested Prescriptions   Pending Prescriptions Disp Refills     venlafaxine (EFFEXOR-XR) 37.5 MG 24 hr capsule [Pharmacy Med Name: VENLAFAXINE HCL ER 37.5 MG CAP] 60 capsule 1     Sig: TAKE 2 CAPSULES (75 MG TOTAL) BY MOUTH DAILY.       Venlafaxine/Desvenlafaxine Refill Protocol Failed - 3/8/2021 12:15 PM        Failed - LFT or AST or ALT in last year     Albumin   Date Value Ref Range Status   01/06/2017 4.1 3.5 - 5.0 g/dL Final     Bilirubin, Total   Date Value Ref Range Status   01/06/2017 0.3 0.0 - 1.0 mg/dL Final     Alkaline Phosphatase   Date Value Ref Range Status   01/06/2017 62 45 - 120 U/L Final     AST   Date Value Ref Range Status   01/06/2017 38 0 - 40 U/L Final     ALT   Date Value Ref Range Status   01/06/2017 36 0 - 45 U/L Final     Protein, Total   Date Value Ref Range Status   01/06/2017 6.9 6.0 - 8.0 g/dL Final                Failed - Fasting lipid cascade in last year     Cholesterol   Date Value Ref Range Status   10/09/2019 155 <=199 mg/dL Final     Triglycerides   Date Value Ref Range Status   10/09/2019 39 <=149 mg/dL Final     HDL Cholesterol   Date Value Ref Range Status   10/09/2019 88 >=50 mg/dL Final     LDL Calculated   Date Value Ref Range Status   10/09/2019 59 <=129 mg/dL Final     Patient Fasting > 8hrs?   Date Value Ref Range Status   10/09/2019 Unknown  Final             Passed - PCP or prescribing provider visit in last year     Last office visit with prescriber/PCP: Visit date not found OR same dept: 12/21/2020 Surekha Contreras MD OR same  specialty: 12/21/2020 Surekha Contreras MD  Last physical: Visit date not found Last MTM visit: Visit date not found   Next visit within 3 mo: Visit date not found  Next physical within 3 mo: Visit date not found  Prescriber OR PCP: Willis Velez DO  Last diagnosis associated with med order: 1. Generalized anxiety disorder  - venlafaxine (EFFEXOR-XR) 37.5 MG 24 hr capsule [Pharmacy Med Name: VENLAFAXINE HCL ER 37.5 MG CAP]; TAKE 2 CAPSULES (75 MG TOTAL) BY MOUTH DAILY.  Dispense: 60 capsule; Refill: 1    If protocol passes may refill for 12 months if within 3 months of last provider visit (or a total of 15 months).             Passed - Blood Pressure in last year     BP Readings from Last 1 Encounters:   12/21/20 118/72

## 2021-06-16 PROBLEM — D50.9 IRON DEFICIENCY ANEMIA: Status: ACTIVE | Noted: 2018-04-27

## 2021-06-16 PROBLEM — H93.13 TINNITUS OF BOTH EARS: Status: ACTIVE | Noted: 2019-04-08

## 2021-06-16 NOTE — PROGRESS NOTES
Assessment:     1. Fatigue  Thyroid Cascade    HM2(CBC w/o Differential)   2. Anxiety     3. Anemia  Ferritin    Iron and Transferrin Iron Binding Capacity       Plan:     Keena is a 48-year-old female presenting today regarding 2 issues.  In regards to her generalized anxiety disorder, I think it is reasonable to continue on her Celexa 40 mg daily but advised that this is the highest dose we would want her on due to the potential for QT prolongation.  I did some pulmonary labs today as it relates to her fatigue.  I took an extensive history and with how long the symptoms have been present with only mild progression and prompt relief of her symptoms with a nap I do not feel that there likely is a physiologic reason for this.  However, her CBC did come back showing a mild anemia and a added a ferritin, iron and TIBC to the lab work.    Subjective:       48 y.o. female presents for a medication check as it relates to her anxiety disorder.  The patient has had some increase in stress in her life and has been seeing a psychotherapist regarding this and how it affects her anxiety.  The psychotherapist advised that she increase the dose of her Celexa to 40 mg daily; we have discussed this in the past and the patient felt comfortable going ahead with it.  The patient has been on the increased dose of Celexa now for a few weeks and is feeling much better.  She would like to continue on this dosage and needs a refill on her medication.  The patient also wanted to discuss fatigue.  The patient states that she remembers ever since being an adolescent that she needed more sleep on average than her friends.  She has been noticing over the past year that she gets tired more easily and has been napping more often.  The patient states that she generally does get at least 8 hours of sleep at night.  She wakes up feeling refreshed and able to do what she needs to.  However, by afternoon she often will feel tired enough to take a  "nap.  If she is able to sleep in the afternoon for perhaps 40 minutes she again feels very refreshed and has been able to make it until bedtime.  She feels that she sleeps extremely well at night.  She denies any other unusual symptoms such as chest pain, trouble breathing, abdominal pain or other issues.  She really does not feel that this is medication related.    The following portions of the patient's history were reviewed and updated as appropriate: allergies, current medications, past family history, past medical history, past social history, past surgical history and problem list.    Review of Systems  Pertinent items are noted in HPI.     History   Smoking Status     Never Smoker   Smokeless Tobacco     Never Used         Objective:      /68 (Patient Site: Left Arm, Patient Position: Sitting, Cuff Size: Adult Regular)  Ht 4' 11.75\" (1.518 m)  Wt 118 lb 3.2 oz (53.6 kg)  BMI 23.28 kg/m2  General appearance: alert, appears stated age and cooperative       This note has been dictated using voice recognition software. Any grammatical or context distortions are unintentional and inherent to the software  "

## 2021-06-17 NOTE — PROGRESS NOTES
Assessment/Plan:     Problem List Items Addressed This Visit     None      Visit Diagnoses     Polydipsia    -  Primary    Relevant Orders    Basic Metabolic Panel    Osmolality, Random, Urine    Glycosylated Hemoglobin A1c            Subjective:       51 y.o. female presents today with questions regarding excessive thirst.  The patient states that she feels thirsty all the time even right after she had a fair amount to drink.  She also often has fairly dry lips.  The patient denies feeling the sense that her mouth is dry.  She does not feel that she urinates frequently.  She also would like me to take a listen to her chest as she had Covid diagnosed late last year and occasionally will still feel this odd sense in her chest.  She did have a chest x-ray shortly after her infection and it came back negative.  She does not have any trouble with breathing and can exercise just fine.  She reports that her anxiety is much improved and attributes part of that to the fact that she and her   recently and she feels that that has actually been a good thing for her.      Reviewed: The following portions of the patient's history were reviewed and updated as appropriate: allergies, current medications, past family history, past medical history, past social history, past surgical history and problem list.    Review of Systems  Pertinent items are noted in HPI.        Objective:     /70 (Patient Site: Left Arm, Patient Position: Sitting, Cuff Size: Adult Regular)   Pulse 81   Wt 105 lb (47.6 kg)   SpO2 98%   BMI 20.85 kg/m    General appearance: alert, appears stated age and cooperative      This note has been dictated using voice recognition software. Any grammatical or context distortions are unintentional and inherent to the software

## 2021-06-17 NOTE — PROGRESS NOTES
Preoperative Exam    Scheduled Procedure: Right Foot Surgery  Surgery Date: 4-30-18  Surgery Location: [facility]    Surgeon: Dr. Doll    Assessment/Plan:     Problem List Items Addressed This Visit     Iron deficiency anemia     Likely due to heavy menses.  Improving at this time on oral iron replacement.         Relevant Orders    HM1(CBC and Differential) (Completed)    Ferritin    HM1 (CBC with Diff) (Completed)      Other Visit Diagnoses     Encounter for preoperative examination for general surgical procedure    -  Primary            Surgical Procedure Risk: Low (reported cardiac risk generally < 1%)  Have you had prior anesthesia?: Yes  Have you or any family members had a previous anesthesia reaction:  Yes: Hard time coming out of it.  Do you or any family members have a history of a clotting or bleeding disorder?: Mom had a blood clot.  Cardiac Risk Assessment: no increased risk for major cardiac complications    Patient approved for surgery with general or local anesthesia.    Functional Status: Independent  Patient plans to recover at home with family.     Subjective:      Keena Vee is a 48 y.o. female who presents for a preoperative consultation.  Patient is also requesting a recheck of her CBC and ferritin.  She was recently diagnosed with mild iron deficiency anemia.  She does report that she has monthly.  In her periods are fairly heavy.  No bleeding between her menses.  No change in her stools.  She did have a colonoscopy approximately 10 years ago that was normal.  No family history of colon cancer or polyps.  She did start on iron supplementation and is continuing to do that although she does have some issues with constipation.  No other concerns today.    All other systems reviewed and are negative, other than those listed in the HPI.    Pertinent History  Do you have difficulty breathing or chest pain after walking up a flight of stairs: No  History of  obstructive sleep apnea: No  Steroid use in the last 6 months: No  Frequent Aspirin/NSAID use: No  Prior Blood Transfusion: No  Prior Blood Transfusion Reaction: No  If for some reason prior to, during or after the procedure, if it is medically indicated, would you be willing to have a blood transfusion?:  There is no transfusion refusal.    Current Outpatient Prescriptions   Medication Sig Dispense Refill     cholecalciferol, vitamin D3, (VITAMIN D3) 4,000 unit cap Take by mouth.       citalopram (CELEXA) 40 MG tablet Take 1 tablet (40 mg total) by mouth daily. 90 tablet 3     cyanocobalamin 1000 MCG tablet Take 1,000 mcg by mouth daily.       ibuprofen (ADVIL,MOTRIN) 200 MG tablet Take 200 mg by mouth every 6 (six) hours as needed for pain.       LINZESS 290 mcg cap capsule TAKE 1 CAPSULE BY MOUTH DAILY ON AN EMPTY STOMACH 30 MINS BEFORE 1ST MEAL SWALLOW WHOLE  11     UNABLE TO FIND Med Name:Tumeric       No current facility-administered medications for this visit.         Allergies   Allergen Reactions     Latex        Patient Active Problem List   Diagnosis     Dermatitis     Lump In / On The Skin     Tendonitis     Limb Weakness Temporary     Hearing Loss     Generalized Anxiety Disorder     Arm pain, right     Iron deficiency anemia       Past Medical History:   Diagnosis Date     Basal cell cancer 2009       Past Surgical History:   Procedure Laterality Date     MOHS SURGERY Right 2009    right side of face near eye     [place] REMOVAL ADENOIDS,PRIMARY,<13 Y/O      Description: Adenoidectomy;  Recorded: 10/02/2009;  Comments: for recurrent OM     [place] REMOVAL OF TONSILS,<13 Y/O      Description: Tonsillectomy;  Recorded: 10/02/2009;  Comments: for strep     UMBILICAL HERNIA REPAIR         Social History     Social History     Marital status:      Spouse name: N/A     Number of children: N/A     Years of education: N/A     Occupational History     Not on file.     Social History Main Topics     Smoking status:  "Never Smoker     Smokeless tobacco: Never Used     Alcohol use Yes      Comment: Rarely     Drug use: No     Sexual activity: Yes     Partners: Male     Other Topics Concern     Not on file     Social History Narrative         Objective:     Vitals:    04/27/18 0835   BP: 112/64   Pulse: 70   Resp: 14   Temp: 97.9  F (36.6  C)   TempSrc: Oral   SpO2: 98%   Weight: 117 lb (53.1 kg)   Height: 4' 11.75\" (1.518 m)   LMP: 04/06/2018         Physical Exam:  Physical Exam   Constitutional: She is oriented to person, place, and time. She appears well-developed and well-nourished. No distress.   HENT:   Right Ear: Tympanic membrane and ear canal normal.   Left Ear: Tympanic membrane and ear canal normal.   Mouth/Throat: Oropharynx is clear and moist.   Eyes: Conjunctivae and EOM are normal. Pupils are equal, round, and reactive to light.   Neck: Normal range of motion. Neck supple. Carotid bruit is not present. No thyromegaly present.   Cardiovascular: Normal rate and regular rhythm.    No murmur heard.  Pulmonary/Chest: Effort normal and breath sounds normal. No respiratory distress. She has no decreased breath sounds. She has no wheezes. She has no rhonchi.   Abdominal: Soft. Bowel sounds are normal. She exhibits no distension. There is no hepatosplenomegaly. There is no tenderness. There is no rigidity, no rebound and no guarding. Hernia confirmed negative in the ventral area.   Lymphadenopathy:     She has no cervical adenopathy.   Neurological: She is alert and oriented to person, place, and time. She has normal strength. No cranial nerve deficit. Gait normal.   Reflex Scores:       Bicep reflexes are 2+ on the right side and 2+ on the left side.       Patellar reflexes are 2+ on the right side and 2+ on the left side.  Skin: No rash noted.   Psychiatric: She has a normal mood and affect. Her behavior is normal. Judgment and thought content normal.       Patient Instructions   We will notify you of lab results.  Stop " Iron until after bowels return to normal after surgery.  Consider Ferrous Gluconate 325 mg every other day for iron deficiency.  I will send pre-op clearance to your surgeon.      Labs:  Recent Results (from the past 24 hour(s))   HM1 (CBC with Diff)    Collection Time: 04/27/18  9:14 AM   Result Value Ref Range    WBC 4.0 4.0 - 11.0 thou/uL    RBC 4.46 3.80 - 5.40 mill/uL    Hemoglobin 12.9 12.0 - 16.0 g/dL    Hematocrit 38.4 35.0 - 47.0 %    MCV 86 80 - 100 fL    MCH 29.0 27.0 - 34.0 pg    MCHC 33.7 32.0 - 36.0 g/dL    RDW 13.0 11.0 - 14.5 %    Platelets 191 140 - 440 thou/uL    MPV 7.8 7.0 - 10.0 fL    Neutrophils % 68 50 - 70 %    Lymphocytes % 23 20 - 40 %    Monocytes % 6 2 - 10 %    Eosinophils % 2 0 - 6 %    Basophils % 0 0 - 2 %    Neutrophils Absolute 2.7 2.0 - 7.7 thou/uL    Lymphocytes Absolute 0.9 0.8 - 4.4 thou/uL    Monocytes Absolute 0.3 0.0 - 0.9 thou/uL    Eosinophils Absolute 0.1 0.0 - 0.4 thou/uL    Basophils Absolute 0.0 0.0 - 0.2 thou/uL       Immunization History   Administered Date(s) Administered     DT (pediatric) 12/16/2005     Hep A, Adult IM (19yr & older) 02/28/2012     Hep A, historic 02/28/2012     Influenza, Seasonal, Inj PF IIV3 10/07/2010, 11/04/2011     Influenza, inj, historic,unspecified 10/03/2008, 11/17/2012     Influenza, seasonal,quad inj 36+ mos 12/12/2017     Influenza,seasonal quad, PF 10/21/2013, 11/09/2014     Influenza,seasonal quad, PF, 36+MOS 10/21/2013, 11/09/2014, 11/02/2015     Influenza,seasonal, Inj IIV3 11/18/2006, 10/03/2008, 11/17/2012     Td, adult adsorbed, PF 12/16/2005     Td,adult,historic,unspecified 12/16/2005     Tdap 10/02/2009           Electronically signed by Radha Tee MD 04/27/18 8:38 AM

## 2021-06-20 NOTE — PROGRESS NOTES
Assessment:     1. Generalized anxiety disorder     2. Vaginal irritation  Wet Prep, Vaginal   3. Medication management  Electrocardiogram Perform - Clinic       Plan:     Keena is a 48-year-old female presenting today with a concern regarding irritation of her labia.  The patient's examination was completely normal today and wet prep was negative.  I reassured the patient and believe this is likely more of a mechanical irritation and perhaps occurred because of recent treatment with some yeast cream causing the tissue to be more sensitive.  We discussed the potential interaction between Celexa and trazodone.  I explained to the patient that she is on a fairly low dose of trazodone and that I think it would be safe.  However, I checked an EKG today and this did not show a prolonged QTc interval.  I asked the patient to follow-up in 1 month regarding how she is doing going back on the Celexa and also to repeat the EKG to make sure that the QTC interval remains normal.  The patient was comfortable with that plan.    Subjective:       48 y.o. female presents for evaluation of a couple of concerns.  The first is in regards to some inflammation and swelling of her labia.  The patient reports that she treated over-the-counter with vaginal yeast cream due to some itching and vaginal discharge.  Towards the end of the treatment the patient reports having intercourse and right after that feeling inflammation, swelling and irritation of her labia.  The patient does not feel recurrent symptoms of her yeast infection.  The patient also reports that since switching to Lexapro she has been feeling much more on edge.  She does not feel that the medication is a good fit for her.  She is taking the trazodone 50 mg at bedtime and finding that quite helpful with sleep.  She is wondering what she should do or if it is possible to go back to Celexa.      Reviewed: The following portions of the patient's history were reviewed and  updated as appropriate: allergies, current medications, past family history, past medical history, past social history, past surgical history and problem list.    Review of Systems  Pertinent items are noted in HPI.       Objective:     /74 (Patient Site: Left Arm, Patient Position: Sitting, Cuff Size: Adult Regular)  Pulse 74  Wt 117 lb (53.1 kg)  Breastfeeding? No  BMI 23.04 kg/m2  General appearance: alert, appears stated age and cooperative  Pelvic: cervix normal in appearance, external genitalia normal and vagina normal without discharge      This note has been dictated using voice recognition software. Any grammatical or context distortions are unintentional and inherent to the software

## 2021-06-20 NOTE — LETTER
Letter by Goldie Khoury RN at      Author: Goldie Khoury RN Service: -- Author Type: --    Filed:  Encounter Date: 7/15/2020 Status: (Other)       7/15/2020        Keena Vee  [address] [place]   [facility] [id number]    This letter provides a written record that you were tested for COVID-19 on 7/11/2020.     Your result was negative. This means that we didnt find the virus that causes COVID-19 in your sample. A test may show negative when you do actually have the virus. This can happen when the virus is in the early stages of infection, before you feel illness symptoms.    If you have symptoms   Stay home and away from others (self-isolate) until you meet ALL of the guidelines below:    Youve had no fever--and no medicine that reduces fever--for 3 full days (72 hours). And ?    Your other symptoms have gotten better. For example, your cough or breathing has improved. And?    At least 10 days have passed since your symptoms started.    During this time:    Stay home. Dont go to work, school or anywhere else.     Stay in your own room, including for meals. Use your own bathroom if you can.    Stay away from others in your home. No hugging, kissing or shaking hands. No visitors.    Clean high touch surfaces often (doorknobs, counters, handles, etc.). Use a household cleaning spray or wipes. You can find a full list on the EPA website at www.epa.gov/pesticide-registration/list-n-disinfectants-use-against-sars-cov-2.    Cover your mouth and nose with a mask, tissue or washcloth to avoid spreading germs.    Wash your hands and face often with soap and water.    Going back to work  Check with your employer for any guidelines to follow for going back to work.    Employers: This document serves as formal notice that your employee tested negative for COVID-19, as of the testing date shown above.

## 2021-06-21 NOTE — PROGRESS NOTES
Assessment:     1. Insomnia  Thyroid Cascade   2. Medication management  Electrocardiogram Perform - Clinic    CANCELED: Electrocardiogram Perform - Clinic   3. Generalized anxiety disorder         Plan:     Keena is a 48-year-old female presenting today for a follow-up medication check visit.  I am pleased that she is doing well back on her Celexa and we will continue this.  It is reasonable to take her trazodone as needed, however, the idea was to take it on a consistent enough basis that we could check an EKG to determine whether there was any QT prolongation with the potential interaction between the citalopram and the trazodone.  Since she has been off of the trazodone for the past 5 nights I do not think we will be able to assess this today.  The patient will start taking the trazodone every night and return to clinic for a lab only EKG in the couple of weeks.  I checked a thyroid Cascade since it is somewhat unusual that she had such an abrupt onset without explanation of insomnia.  However, there is by history today really no other clues to explain why.  Patient to continue with consistent use of trazodone and Celexa and follow-up with me in 3 months to reassess.    Subjective:       48 y.o. female presents today for a follow-up regarding generalized anxiety disorder and Celexa.  The patient has been seeing her therapist and recently within the last 6-8 weeks developed rather abrupt onset of difficulty sleeping.  She ascribes this as awakening every couple of hours throughout the night and it has been consistent and persistent since its onset.  She really cannot think of anything that could have triggered this difficulty and she states that she is always slept well previously.  The patient spoke to her therapist about this who suggested that a prescription for trazodone may be a good idea.  I discussed with the patient that there is a rare but potential interaction from a cardiac perspective between  citalopram and trazodone and that made the patient concerned and we decided to switch to Lexapro as we felt that that would be an easy transition.  However, the patient had significant side effects to the Lexapro and felt very shaky and agitated.  We discussed further the very low risk of interaction and discussed this with a pharmacist as well who agreed.  She comes in today stating that she did find the trazodone helpful but has taken it more as needed than consistent every night.  She has not taken it for the past 5 or 6 nights.  During that time she really has not slept well.  However, she is feeling much better back on her Celexa 40 mg daily.      Reviewed: The following portions of the patient's history were reviewed and updated as appropriate: allergies, current medications, past family history, past medical history, past social history, past surgical history and problem list.    Review of Systems  Pertinent items are noted in HPI.       Objective:     /72 (Patient Site: Left Arm, Patient Position: Sitting, Cuff Size: Adult Regular)  Pulse (!) 58  Wt 117 lb (53.1 kg)  BMI 23.04 kg/m2  General appearance: alert, appears stated age and cooperative      This note has been dictated using voice recognition software. Any grammatical or context distortions are unintentional and inherent to the software

## 2021-06-30 ENCOUNTER — AMBULATORY - HEALTHEAST (OUTPATIENT)
Dept: FAMILY MEDICINE | Facility: CLINIC | Age: 51
End: 2021-06-30

## 2021-06-30 DIAGNOSIS — D64.9 ANEMIA, UNSPECIFIED TYPE: ICD-10-CM

## 2021-07-02 ENCOUNTER — COMMUNICATION - HEALTHEAST (OUTPATIENT)
Dept: FAMILY MEDICINE | Facility: CLINIC | Age: 51
End: 2021-07-02

## 2021-07-02 DIAGNOSIS — D64.9 ANEMIA, UNSPECIFIED TYPE: ICD-10-CM

## 2021-07-03 NOTE — ADDENDUM NOTE
Addendum Note by Surekha Contreras MD at 9/12/2018  1:21 PM     Author: Surekha Contreras MD Service: -- Author Type: Physician    Filed: 9/12/2018  1:21 PM Encounter Date: 8/31/2018 Status: Signed    : Surekha Contreras MD (Physician)    Addended by: SUREKHA CONTRERAS on: 9/12/2018 01:21 PM        Modules accepted: Orders

## 2021-07-03 NOTE — ADDENDUM NOTE
Addendum Note by Surekha Contreras MD at 9/10/2018  3:52 PM     Author: Surekha Contreras MD Service: -- Author Type: Physician    Filed: 9/10/2018  3:52 PM Encounter Date: 9/10/2018 Status: Signed    : Surekha Contreras MD (Physician)    Addended by: SUREKHA CONTRERAS on: 9/10/2018 03:52 PM        Modules accepted: Orders

## 2021-07-04 NOTE — TELEPHONE ENCOUNTER
Telephone Encounter by Janette Vega CMA at 7/2/2021  2:14 PM     Author: Janette Vega CMA Service: -- Author Type: Medical Assistant    Filed: 7/2/2021  2:17 PM Encounter Date: 7/2/2021 Status: Signed    : Janette Vega CMA (Medical Assistant)       Left message to call back for: results  Information to relay to patient: please see below and relay.   My chart sent as well

## 2021-07-04 NOTE — TELEPHONE ENCOUNTER
Telephone Encounter by Radha Tee MD at 7/2/2021  3:14 PM     Author: Radha Tee MD Service: -- Author Type: Physician    Filed: 7/2/2021  3:15 PM Encounter Date: 7/2/2021 Status: Signed    : Radha Tee MD (Physician)       Note sent to patient via [email or web address]:    Keena,  I am responding for Dr. Contreras who is out of the office today. Unfortunately there is not really anything for you to do between now and your surgery on 7/6 to increase your hemoglobin. You should have the repeat labs as Dr. Contreras recommended on Monday. She may recommend some further evaluation or management based on those results.    Radha Tee MD

## 2021-07-04 NOTE — TELEPHONE ENCOUNTER
Telephone Encounter by Janette Vega CMA at 7/2/2021  2:13 PM     Author: Janette Vega CMA Service: -- Author Type: Medical Assistant    Filed: 7/2/2021  2:13 PM Encounter Date: 7/2/2021 Status: Signed    : Janette Vega CMA (Medical Assistant)       ----- Message from Surekha Contreras MD sent at 6/30/2021  4:00 PM CDT -----  Please call patient and let her know that her hemoglobin was a bit low at 10.5 with normal being 12 or greater.  It would be great if she could come in next week to have her blood redrawn and get a complete blood count before she has surgery.  This would not be low enough that I think it would be any contraindication to her proceeding with her surgery, however, I would not want to ignore that this is abnormal.  Also, can you ask if she has any bleeding issues at present?  Does she have a menstrual cycle and heavy bleeding?

## 2021-07-05 ENCOUNTER — AMBULATORY - HEALTHEAST (OUTPATIENT)
Dept: LAB | Facility: CLINIC | Age: 51
End: 2021-07-05

## 2021-07-05 DIAGNOSIS — D64.9 ANEMIA, UNSPECIFIED TYPE: ICD-10-CM

## 2021-07-05 LAB
ERYTHROCYTE [DISTWIDTH] IN BLOOD BY AUTOMATED COUNT: 14.3 % (ref 11–14.5)
HCT VFR BLD AUTO: 33.2 % (ref 35–47)
HGB BLD-MCNC: 10.9 G/DL (ref 12–16)
MCH RBC QN AUTO: 26 PG (ref 27–34)
MCHC RBC AUTO-ENTMCNC: 32.8 G/DL (ref 32–36)
MCV RBC AUTO: 79 FL (ref 80–100)
PLATELET # BLD AUTO: 232 THOU/UL (ref 140–440)
PMV BLD AUTO: 9 FL (ref 7–10)
RBC # BLD AUTO: 4.2 MILL/UL (ref 3.8–5.4)
WBC: 4.6 THOU/UL (ref 4–11)

## 2021-07-06 ENCOUNTER — COMMUNICATION - HEALTHEAST (OUTPATIENT)
Dept: FAMILY MEDICINE | Facility: CLINIC | Age: 51
End: 2021-07-06

## 2021-07-06 DIAGNOSIS — B37.31 CANDIDAL VULVOVAGINITIS: ICD-10-CM

## 2021-07-08 ENCOUNTER — COMMUNICATION - HEALTHEAST (OUTPATIENT)
Dept: FAMILY MEDICINE | Facility: CLINIC | Age: 51
End: 2021-07-08

## 2021-07-15 DIAGNOSIS — F41.1 GENERALIZED ANXIETY DISORDER: ICD-10-CM

## 2021-07-15 RX ORDER — TRAZODONE HYDROCHLORIDE 50 MG/1
TABLET, FILM COATED ORAL
Qty: 30 TABLET | Refills: 8 | Status: SHIPPED | OUTPATIENT
Start: 2021-07-15 | End: 2021-07-29

## 2021-07-21 ENCOUNTER — RECORDS - HEALTHEAST (OUTPATIENT)
Dept: ADMINISTRATIVE | Facility: CLINIC | Age: 51
End: 2021-07-21

## 2021-07-22 ENCOUNTER — TELEPHONE (OUTPATIENT)
Dept: FAMILY MEDICINE | Facility: CLINIC | Age: 51
End: 2021-07-22

## 2021-07-22 NOTE — TELEPHONE ENCOUNTER
Reason for Call:  Other call back    Detailed comments: Pt had Right shoulder surgery 2 weeks ago. She has now developed an itching rash on her right arm. She has tried cortisone and benadryl. It give her a slight relief then comes right back. She called the surgeon and they said to use benadryl because it might be a reaction to the bandages. She has been using the same bandage the whole 2 weeks. Pt is not sure what to do.    Phone Number Patient can be reached at: Home number on file [phone] (home)    Best Time:     Can we leave a detailed message on this number? YES    Call taken on 7/22/2021 at 1:10 PM by Dee Dee Langston

## 2021-07-22 NOTE — TELEPHONE ENCOUNTER
Discussed with patient and suggest that she likely will need to be seen to have the rash evaluated.  She is going to try calamine lotion and follow-up if needed.

## 2021-07-23 ENCOUNTER — TRANSFERRED RECORDS (OUTPATIENT)
Dept: HEALTH INFORMATION MANAGEMENT | Facility: CLINIC | Age: 51
End: 2021-07-23

## 2021-07-28 ENCOUNTER — HOSPITAL ENCOUNTER (OUTPATIENT)
Dept: CT IMAGING | Facility: CLINIC | Age: 51
Discharge: HOME | End: 2021-07-28
Attending: FAMILY MEDICINE | Admitting: FAMILY MEDICINE
Payer: COMMERCIAL

## 2021-07-28 DIAGNOSIS — Z82.49 FAMILY HISTORY OF PREMATURE CORONARY HEART DISEASE: ICD-10-CM

## 2021-07-28 LAB
CV CALCIUM SCORE AGATSTON LM: 0
CV CALCIUM SCORING AGATSON LAD: 0
CV CALCIUM SCORING AGATSTON CX: 0
CV CALCIUM SCORING AGATSTON RCA: 0
CV CALCIUM SCORING AGATSTON TOTAL: 0

## 2021-07-28 PROCEDURE — 75571 CT HRT W/O DYE W/CA TEST: CPT | Mod: 26 | Performed by: GENERAL ACUTE CARE HOSPITAL

## 2021-07-28 PROCEDURE — 75571 CT HRT W/O DYE W/CA TEST: CPT

## 2021-07-29 ENCOUNTER — OFFICE VISIT (OUTPATIENT)
Dept: FAMILY MEDICINE | Facility: CLINIC | Age: 51
End: 2021-07-29
Payer: COMMERCIAL

## 2021-07-29 VITALS
DIASTOLIC BLOOD PRESSURE: 60 MMHG | SYSTOLIC BLOOD PRESSURE: 100 MMHG | HEIGHT: 60 IN | HEART RATE: 85 BPM | BODY MASS INDEX: 20.81 KG/M2 | OXYGEN SATURATION: 99 % | WEIGHT: 106 LBS

## 2021-07-29 DIAGNOSIS — Z12.31 ENCOUNTER FOR SCREENING MAMMOGRAM FOR BREAST CANCER: ICD-10-CM

## 2021-07-29 DIAGNOSIS — Z00.00 ROUTINE GENERAL MEDICAL EXAMINATION AT A HEALTH CARE FACILITY: Primary | ICD-10-CM

## 2021-07-29 DIAGNOSIS — F41.1 GENERALIZED ANXIETY DISORDER: ICD-10-CM

## 2021-07-29 PROBLEM — H93.13 TINNITUS OF BOTH EARS: Status: RESOLVED | Noted: 2019-04-08 | Resolved: 2021-07-29

## 2021-07-29 PROBLEM — M79.601 ARM PAIN, RIGHT: Status: RESOLVED | Noted: 2017-03-09 | Resolved: 2021-07-29

## 2021-07-29 PROCEDURE — 99396 PREV VISIT EST AGE 40-64: CPT | Performed by: FAMILY MEDICINE

## 2021-07-29 RX ORDER — TRAZODONE HYDROCHLORIDE 50 MG/1
100 TABLET, FILM COATED ORAL AT BEDTIME
Qty: 60 TABLET | Refills: 3 | Status: SHIPPED | OUTPATIENT
Start: 2021-07-29 | End: 2022-02-07

## 2021-07-29 ASSESSMENT — MIFFLIN-ST. JEOR: SCORE: 1004.37

## 2021-07-29 NOTE — PROGRESS NOTES
SUBJECTIVE:   CC: Keena Vee is an 51 year old woman who presents for preventive health visit.       Patient has been advised of split billing requirements and indicates understanding: Yes  Healthy Habits:     Getting at least 3 servings of Calcium per day:  Yes    Bi-annual eye exam:  Yes    Dental care twice a year:  Yes    Sleep apnea or symptoms of sleep apnea:  None    Diet:  Gluten-free/reduced and Other    Frequency of exercise:  2-3 days/week    Duration of exercise:  30-45 minutes    Taking medications regularly:  Yes    Medication side effects:  None    PHQ-2 Total Score: 2    Additional concerns today:  No        Today's PHQ-2 Score:   PHQ-2 ( 1999 [employer]) 7/26/2021   Q1: Little interest or pleasure in doing things 1   Q2: Feeling down, depressed or hopeless 1   PHQ-2 Score 2   Q1: Little interest or pleasure in doing things Several days   Q2: Feeling down, depressed or hopeless Several days   PHQ-2 Score 2       Abuse: Current or Past (Physical, Sexual or Emotional) - No  Do you feel safe in your environment? Yes    Have you ever done Advance Care Planning? (For example, a Health Directive, POLST, or a discussion with a medical provider or your loved ones about your wishes): Yes, patient states has an Advance Care Planning document and will bring a copy to the clinic.    Social History     Tobacco Use     Smoking status: Never Smoker     Smokeless tobacco: Never Used   Substance Use Topics     Alcohol use: Yes     Comment: Alcoholic Drinks/day: Rarely     If you drink alcohol do you typically have >3 drinks per day or >7 drinks per week? No    Alcohol Use 7/29/2021   Prescreen: >3 drinks/day or >7 drinks/week? -   Prescreen: >3 drinks/day or >7 drinks/week? No       Reviewed orders with patient.  Reviewed health maintenance and updated orders accordingly - Yes      Breast Cancer Screening:  Any new diagnosis of family breast, ovarian, or bowel cancer? No    FHS-7: No flowsheet data  "found.  click delete button to remove this line now  Mammogram Screening: Recommended annual mammography  Pertinent mammograms are reviewed under the imaging tab.    History of abnormal Pap smear: Yes, s/p Leep procedure May 2021 and will need pap every 6 months for 2 years. Followed by gynecologist  PAP / HPV 9/9/2019 2/22/2019   HPV See Scanned Report(A) See Scanned Report(A)     Reviewed and updated as needed this visit by clinical staff  Tobacco                Reviewed and updated as needed this visit by Provider                    Review of Systems  CONSTITUTIONAL: NEGATIVE for fever, chills, change in weight  INTEGUMENTARU/SKIN: NEGATIVE for worrisome rashes, moles or lesions  EYES: NEGATIVE for vision changes or irritation  ENT: NEGATIVE for ear, mouth and throat problems  RESP: NEGATIVE for significant cough or SOB  BREAST: NEGATIVE for masses, tenderness or discharge  CV: NEGATIVE for chest pain, palpitations or peripheral edema  GI: NEGATIVE for nausea, abdominal pain, heartburn, or change in bowel habits  : NEGATIVE for unusual urinary or vaginal symptoms. Periods are regular.  MUSCULOSKELETAL: NEGATIVE for significant arthralgias or myalgia  NEURO: NEGATIVE for weakness, dizziness or paresthesias  PSYCHIATRIC: NEGATIVE for changes in mood or affect     OBJECTIVE:   /60 (BP Location: Left arm, Patient Position: Left side, Cuff Size: Adult Regular)   Pulse 85   Ht 1.511 m (4' 11.5\")   Wt 48.1 kg (106 lb)   SpO2 99%   BMI 21.05 kg/m    Physical Exam  GENERAL: healthy, alert and no distress  EYES: Eyes grossly normal to inspection, PERRL and conjunctivae and sclerae normal  HENT: ear canals and TM's normal, nose and mouth without ulcers or lesions  NECK: no adenopathy, no asymmetry, masses, or scars and thyroid normal to palpation  RESP: lungs clear to auscultation - no rales, rhonchi or wheezes  BREAST: normal without masses, tenderness or nipple discharge and no palpable axillary masses or " "adenopathy  CV: regular rate and rhythm, normal S1 S2, no S3 or S4, no murmur, click or rub, no peripheral edema and peripheral pulses strong  ABDOMEN: soft, nontender, no hepatosplenomegaly, no masses and bowel sounds normal  MS: no gross musculoskeletal defects noted, no edema  SKIN: no suspicious lesions or rashes  NEURO: Normal strength and tone, mentation intact and speech normal  PSYCH: mentation appears normal, affect normal/bright    Diagnostic Test Results:  Labs reviewed in Epic    ASSESSMENT/PLAN:     Problem List Items Addressed This Visit        Behavioral    Generalized Anxiety Disorder     Doing well on venlafaxine XR 75 mg daily.  However, she has been struggling more with getting a full night sleep and I recommended increasing trazodone to 100 mg at bedtime.         Relevant Medications    traZODone (DESYREL) 50 MG tablet      Other Visit Diagnoses     Routine general medical examination at a health care facility    -  Primary    Encounter for screening mammogram for breast cancer        Relevant Orders    *MA Screening Digital Bilateral          Patient has been advised of split billing requirements and indicates understanding: Yes  COUNSELING:  Reviewed preventive health counseling, as reflected in patient instructions    Estimated body mass index is 21.05 kg/m  as calculated from the following:    Height as of this encounter: 1.511 m (4' 11.5\").    Weight as of this encounter: 48.1 kg (106 lb).        She reports that she has never smoked. She has never used smokeless tobacco.      Counseling Resources:  ATP IV Guidelines  Pooled Cohorts Equation Calculator  Breast Cancer Risk Calculator  BRCA-Related Cancer Risk Assessment: FHS-7 Tool  FRAX Risk Assessment  ICSI Preventive Guidelines  Dietary Guidelines for Americans, 2010  USDA's MyPlate  ASA Prophylaxis  Lung CA Screening    ARTHUR ALVES  [facility]"

## 2021-07-29 NOTE — ASSESSMENT & PLAN NOTE
Doing well on venlafaxine XR 75 mg daily.  However, she has been struggling more with getting a full night sleep and I recommended increasing trazodone to 100 mg at bedtime.   Spoke with pt and informed her the form was sent to the insurance company. Nothing further.

## 2021-08-03 ENCOUNTER — ANCILLARY PROCEDURE (OUTPATIENT)
Dept: MAMMOGRAPHY | Facility: CLINIC | Age: 51
End: 2021-08-03
Attending: OBSTETRICS & GYNECOLOGY
Payer: COMMERCIAL

## 2021-08-03 DIAGNOSIS — Z12.31 VISIT FOR SCREENING MAMMOGRAM: ICD-10-CM

## 2021-08-03 PROCEDURE — 77063 BREAST TOMOSYNTHESIS BI: CPT

## 2021-08-11 ENCOUNTER — TRANSFERRED RECORDS (OUTPATIENT)
Dept: HEALTH INFORMATION MANAGEMENT | Facility: CLINIC | Age: 51
End: 2021-08-11

## 2021-08-27 ENCOUNTER — TRANSFERRED RECORDS (OUTPATIENT)
Dept: HEALTH INFORMATION MANAGEMENT | Facility: CLINIC | Age: 51
End: 2021-08-27

## 2021-10-10 ENCOUNTER — HEALTH MAINTENANCE LETTER (OUTPATIENT)
Age: 51
End: 2021-10-10

## 2021-10-12 ENCOUNTER — TELEPHONE (OUTPATIENT)
Dept: FAMILY MEDICINE | Facility: CLINIC | Age: 51
End: 2021-10-12

## 2021-10-12 ENCOUNTER — TELEPHONE (OUTPATIENT)
Dept: LAB | Facility: CLINIC | Age: 51
End: 2021-10-12

## 2021-10-13 ENCOUNTER — TELEPHONE (OUTPATIENT)
Dept: LAB | Facility: CLINIC | Age: 51
End: 2021-10-13

## 2021-10-13 DIAGNOSIS — D64.9 ANEMIA, UNSPECIFIED TYPE: Primary | ICD-10-CM

## 2021-10-14 ENCOUNTER — ALLIED HEALTH/NURSE VISIT (OUTPATIENT)
Dept: FAMILY MEDICINE | Facility: CLINIC | Age: 51
End: 2021-10-14
Payer: COMMERCIAL

## 2021-10-14 ENCOUNTER — LAB (OUTPATIENT)
Dept: LAB | Facility: CLINIC | Age: 51
End: 2021-10-14
Payer: COMMERCIAL

## 2021-10-14 DIAGNOSIS — Z23 IMMUNIZATION DUE: Primary | ICD-10-CM

## 2021-10-14 DIAGNOSIS — D64.9 ANEMIA, UNSPECIFIED TYPE: ICD-10-CM

## 2021-10-14 LAB
ERYTHROCYTE [DISTWIDTH] IN BLOOD BY AUTOMATED COUNT: 12.7 % (ref 10–15)
HCT VFR BLD AUTO: 38.6 % (ref 35–47)
HGB BLD-MCNC: 12.9 G/DL (ref 11.7–15.7)
MCH RBC QN AUTO: 28.5 PG (ref 26.5–33)
MCHC RBC AUTO-ENTMCNC: 33.4 G/DL (ref 31.5–36.5)
MCV RBC AUTO: 85 FL (ref 78–100)
PLATELET # BLD AUTO: 241 10E3/UL (ref 150–450)
RBC # BLD AUTO: 4.53 10E6/UL (ref 3.8–5.2)
WBC # BLD AUTO: 4.9 10E3/UL (ref 4–11)

## 2021-10-14 PROCEDURE — 90472 IMMUNIZATION ADMIN EACH ADD: CPT

## 2021-10-14 PROCEDURE — 90651 9VHPV VACCINE 2/3 DOSE IM: CPT

## 2021-10-14 PROCEDURE — 85027 COMPLETE CBC AUTOMATED: CPT

## 2021-10-14 PROCEDURE — 36415 COLL VENOUS BLD VENIPUNCTURE: CPT

## 2021-10-14 PROCEDURE — 90471 IMMUNIZATION ADMIN: CPT

## 2021-10-14 PROCEDURE — 99207 PR NO CHARGE NURSE ONLY: CPT

## 2021-10-14 PROCEDURE — 90682 RIV4 VACC RECOMBINANT DNA IM: CPT

## 2021-10-27 ENCOUNTER — VIRTUAL VISIT (OUTPATIENT)
Dept: FAMILY MEDICINE | Facility: CLINIC | Age: 51
End: 2021-10-27
Payer: COMMERCIAL

## 2021-10-27 DIAGNOSIS — F41.1 GENERALIZED ANXIETY DISORDER: ICD-10-CM

## 2021-10-27 DIAGNOSIS — R41.3 MEMORY LOSS: Primary | ICD-10-CM

## 2021-10-27 PROCEDURE — 99213 OFFICE O/P EST LOW 20 MIN: CPT | Mod: 95 | Performed by: FAMILY MEDICINE

## 2021-10-27 NOTE — LETTER
October 27, 2021      Keena Vee  [address] [place] [address] [address]   [facility] [id number]        To Whom It May Concern:    Keena Vee was referred for a coronary calcium test due to her family history of premature heart disease.  The patient's father had a heart attack in his 40s and then underwent coronary bypass grafting at age 50.  In addition, the patient's paternal grandmother had a heart attack around the age of 60 and her maternal grandfather had a heart attack at age 40.  We discussed her concerning family history and I recommended a coronary calcium test be performed in order to better evaluate her personal risk.    Sincerely,        ARTHUR ALVES

## 2021-10-27 NOTE — PROGRESS NOTES
"Keena is a 51 year old who is being evaluated via a billable video visit.      How would you like to obtain your AVS? MyChart  If the video visit is dropped, the invitation should be resent by: Text to cell phone: [phone]   Will anyone else be joining your video visit? No      Video Start Time: 7:30    Assessment & Plan   Problem List Items Addressed This Visit        Behavioral    Generalized Anxiety Disorder     Currently doing very well on venlafaxine Exar 75 mg daily.           Other Visit Diagnoses     Memory loss    -  Primary    Recommended a neuropsychometric test as a next step and will reconnect following those results.  I also ordered a TSH and a B12    Relevant Orders    Vitamin B12    TSH with free T4 reflex    NEUROPSYCHOLOGY REFERRAL           Follow up following neuropsych results    [facility]UR [facility]    Delta Brink is a 51 year old who presents today with concerns regarding \"brain fog.\"  The patient has a new job she has been it for the past 6 weeks.  She has a number of new protocols to learn and is finding that she is having trouble retaining that information.  In addition, she has been surprised that forgetting which drawer something is in even though she had been in there a short time before.  She notes that this is affecting her ability to do her job and she is concerned.  The patient notes that she also had another job and was making careless errors on that job and was told to take Aleve.  However, this was during the peak of her separation from her  before they went through a divorce.  She normally would have attributed these things to anxiety but feels that her overall stress level and anxiety are the best they have been in a long time.  She does note some of the same issue at home.  She feels that this is definitely a change from previous.          Objective           Vitals:  No vitals were obtained today due to virtual " visit.    Physical Exam   GENERAL: Healthy, alert and no distress  EYES: Eyes grossly normal to inspection.  No discharge or erythema, or obvious scleral/conjunctival abnormalities.  RESP: No audible wheeze, cough, or visible cyanosis.  No visible retractions or increased work of breathing.    SKIN: Visible skin clear. No significant rash, abnormal pigmentation or lesions.  NEURO: Cranial nerves grossly intact.  Mentation and speech appropriate for age.  PSYCH: Mentation appears normal, affect normal/bright, judgement and insight intact, normal speech and appearance well-groomed.            Video-Visit Details    Type of service:  Video Visit    Video End Time:7:46 AM    Originating Location (pt. Location): Home    Distant Location (provider location):  [facility]     Platform used for Video Visit: Leho

## 2021-12-13 ENCOUNTER — TELEPHONE (OUTPATIENT)
Dept: FAMILY MEDICINE | Facility: CLINIC | Age: 51
End: 2021-12-13

## 2021-12-14 ENCOUNTER — ALLIED HEALTH/NURSE VISIT (OUTPATIENT)
Dept: FAMILY MEDICINE | Facility: CLINIC | Age: 51
End: 2021-12-14
Payer: COMMERCIAL

## 2021-12-14 ENCOUNTER — TELEPHONE (OUTPATIENT)
Dept: FAMILY MEDICINE | Facility: CLINIC | Age: 51
End: 2021-12-14

## 2021-12-14 DIAGNOSIS — Z23 ENCOUNTER FOR IMMUNIZATION: Primary | ICD-10-CM

## 2021-12-14 DIAGNOSIS — Z23 ENCOUNTER FOR IMMUNIZATION: ICD-10-CM

## 2021-12-14 PROCEDURE — 99207 PR NO CHARGE NURSE ONLY: CPT

## 2021-12-14 PROCEDURE — 90651 9VHPV VACCINE 2/3 DOSE IM: CPT

## 2021-12-14 PROCEDURE — 90471 IMMUNIZATION ADMIN: CPT

## 2021-12-14 NOTE — PROGRESS NOTES
Clinic Administered Medication Documentation        Inhalable/Nebs Medication Documentation    Patient was given #2 HPV. Prior to medication administration, verified patients identity using patient s name and date of birth. Please see MAR and medication order for additional information.     Expiration Date:  04/30/23

## 2022-02-05 DIAGNOSIS — F41.1 GENERALIZED ANXIETY DISORDER: ICD-10-CM

## 2022-02-07 DIAGNOSIS — F41.1 GENERALIZED ANXIETY DISORDER: Primary | ICD-10-CM

## 2022-02-07 RX ORDER — VENLAFAXINE HYDROCHLORIDE 37.5 MG/1
CAPSULE, EXTENDED RELEASE ORAL
COMMUNITY
Start: 2021-03-09 | End: 2022-02-07

## 2022-02-07 RX ORDER — VENLAFAXINE HYDROCHLORIDE 75 MG/1
75 CAPSULE, EXTENDED RELEASE ORAL DAILY
Qty: 90 CAPSULE | Refills: 1 | Status: SHIPPED | OUTPATIENT
Start: 2022-02-07 | End: 2022-05-20

## 2022-02-07 RX ORDER — VENLAFAXINE HYDROCHLORIDE 37.5 MG/1
CAPSULE, EXTENDED RELEASE ORAL
Qty: 90 CAPSULE | Refills: 1 | Status: SHIPPED | OUTPATIENT
Start: 2022-02-07 | End: 2022-07-21

## 2022-02-07 RX ORDER — VENLAFAXINE HYDROCHLORIDE 37.5 MG/1
CAPSULE, EXTENDED RELEASE ORAL
Qty: 90 CAPSULE | Refills: 1 | OUTPATIENT
Start: 2022-02-07 | End: 2023-02-07

## 2022-02-07 RX ORDER — TRAZODONE HYDROCHLORIDE 50 MG/1
50 TABLET, FILM COATED ORAL AT BEDTIME
Qty: 90 TABLET | Refills: 3 | Status: SHIPPED | OUTPATIENT
Start: 2022-02-07

## 2022-02-07 NOTE — TELEPHONE ENCOUNTER
"Routing refill request to provider for review/approval because:  A break in medication      Last Written Prescription Date:  7/29/21  Last Fill Quantity: 60,  # refills: 3   Last office visit provider:   10/27/21    Requested Prescriptions   Pending Prescriptions Disp Refills     traZODone (DESYREL) 50 MG tablet [Pharmacy Med Name: TRAZODONE 50 MG TABLET] 60 tablet 3     Sig: TAKE 2 TABLETS (100 MG) BY MOUTH AT BEDTIME       Serotonin Modulators Passed - 2/5/2022  8:17 AM        Passed - Recent (12 mo) or future (30 days) visit within the authorizing provider's specialty     Patient has had an office visit with the authorizing provider or a provider within the authorizing providers department within the previous 12 mos or has a future within next 30 days. See \"Patient Info\" tab in inbasket, or \"Choose Columns\" in Meds & Orders section of the refill encounter.              Passed - Medication is active on med list        Passed - Patient is age 18 or older        Passed - No active pregnancy on record        Passed - No positive pregnancy test in past 12 months             Patricia Navarro RN 02/07/22 11:12 AM  "

## 2022-02-28 DIAGNOSIS — F41.1 GENERALIZED ANXIETY DISORDER: ICD-10-CM

## 2022-03-02 RX ORDER — VENLAFAXINE HYDROCHLORIDE 37.5 MG/1
CAPSULE, EXTENDED RELEASE ORAL
Qty: 30 CAPSULE | Refills: 11 | OUTPATIENT
Start: 2022-03-02

## 2022-03-12 ENCOUNTER — TELEPHONE (OUTPATIENT)
Dept: FAMILY MEDICINE | Facility: CLINIC | Age: 52
End: 2022-03-12
Payer: COMMERCIAL

## 2022-03-12 DIAGNOSIS — Z23 NEED FOR VACCINATION: Primary | ICD-10-CM

## 2022-03-12 NOTE — TELEPHONE ENCOUNTER
Pt is on 4/14/22 \[facility]\"" schedule for HPV #3 immunization.    Please review/approve pended order.    Thank you.

## 2022-04-14 ENCOUNTER — ALLIED HEALTH/NURSE VISIT (OUTPATIENT)
Dept: FAMILY MEDICINE | Facility: CLINIC | Age: 52
End: 2022-04-14
Payer: COMMERCIAL

## 2022-04-14 DIAGNOSIS — Z23 NEED FOR VACCINATION: ICD-10-CM

## 2022-04-14 PROCEDURE — 90651 9VHPV VACCINE 2/3 DOSE IM: CPT

## 2022-04-14 PROCEDURE — 90471 IMMUNIZATION ADMIN: CPT

## 2022-04-14 PROCEDURE — 99207 PR NO CHARGE NURSE ONLY: CPT

## 2022-04-16 DIAGNOSIS — F41.1 GENERALIZED ANXIETY DISORDER: ICD-10-CM

## 2022-04-18 RX ORDER — VENLAFAXINE HYDROCHLORIDE 37.5 MG/1
CAPSULE, EXTENDED RELEASE ORAL
Qty: 30 CAPSULE | Refills: 11 | OUTPATIENT
Start: 2022-04-18

## 2022-05-02 ENCOUNTER — TRANSFERRED RECORDS (OUTPATIENT)
Dept: HEALTH INFORMATION MANAGEMENT | Facility: CLINIC | Age: 52
End: 2022-05-02
Payer: COMMERCIAL

## 2022-05-23 ENCOUNTER — TRANSFERRED RECORDS (OUTPATIENT)
Dept: HEALTH INFORMATION MANAGEMENT | Facility: CLINIC | Age: 52
End: 2022-05-23
Payer: COMMERCIAL

## 2022-06-15 ENCOUNTER — HOSPITAL ENCOUNTER (OUTPATIENT)
Dept: MAMMOGRAPHY | Facility: CLINIC | Age: 52
Discharge: HOME | End: 2022-06-15
Attending: FAMILY MEDICINE | Admitting: FAMILY MEDICINE
Payer: COMMERCIAL

## 2022-06-15 DIAGNOSIS — Z12.31 ENCOUNTER FOR SCREENING MAMMOGRAM FOR BREAST CANCER: ICD-10-CM

## 2022-06-15 PROCEDURE — 77067 SCR MAMMO BI INCL CAD: CPT

## 2022-06-18 ENCOUNTER — MYC MEDICAL ADVICE (OUTPATIENT)
Dept: FAMILY MEDICINE | Facility: CLINIC | Age: 52
End: 2022-06-18
Payer: COMMERCIAL

## 2022-06-20 NOTE — TELEPHONE ENCOUNTER
Pt reports increased bruising to extremities. Bumps into things without knowing, denies any recent falls. Not on anticoagulants.  States this has happened in the past and it cleared up after months.  Recently added iron supp to prevent anemia from the spotting after having IUD placed on 6/7/22.    Wondering about diet recommendations to limit bruising.    Lebron Olguin RN  [facility]

## 2022-06-28 ENCOUNTER — HOSPITAL ENCOUNTER (OUTPATIENT)
Dept: MAMMOGRAPHY | Facility: CLINIC | Age: 52
Discharge: HOME | End: 2022-06-28
Attending: FAMILY MEDICINE | Admitting: FAMILY MEDICINE
Payer: COMMERCIAL

## 2022-06-28 DIAGNOSIS — N63.0 BREAST MASS: ICD-10-CM

## 2022-06-28 PROCEDURE — 76642 ULTRASOUND BREAST LIMITED: CPT | Mod: RT

## 2022-07-19 ENCOUNTER — TELEPHONE (OUTPATIENT)
Dept: FAMILY MEDICINE | Facility: CLINIC | Age: 52
End: 2022-07-19

## 2022-07-19 NOTE — TELEPHONE ENCOUNTER
Left message for pt to call back- need to reschedule appt for 7/20 with Dr Valdes  Please r/s pt with PCP for acute visit

## 2022-07-25 ENCOUNTER — OFFICE VISIT (OUTPATIENT)
Dept: FAMILY MEDICINE | Facility: CLINIC | Age: 52
End: 2022-07-25
Payer: COMMERCIAL

## 2022-07-25 VITALS — DIASTOLIC BLOOD PRESSURE: 72 MMHG | SYSTOLIC BLOOD PRESSURE: 118 MMHG | HEART RATE: 85 BPM | OXYGEN SATURATION: 99 %

## 2022-07-25 DIAGNOSIS — M25.511 ACUTE PAIN OF RIGHT SHOULDER: ICD-10-CM

## 2022-07-25 DIAGNOSIS — M77.02 MEDIAL EPICONDYLITIS OF ELBOW, LEFT: Primary | ICD-10-CM

## 2022-07-25 DIAGNOSIS — F41.1 GENERALIZED ANXIETY DISORDER: ICD-10-CM

## 2022-07-25 PROCEDURE — 99213 OFFICE O/P EST LOW 20 MIN: CPT | Performed by: FAMILY MEDICINE

## 2022-07-25 RX ORDER — NABUMETONE 500 MG/1
500 TABLET, FILM COATED ORAL 2 TIMES DAILY
Qty: 10 TABLET | Refills: 0 | Status: SHIPPED | OUTPATIENT
Start: 2022-07-25 | End: 2022-10-12

## 2022-07-25 RX ORDER — VENLAFAXINE HYDROCHLORIDE 37.5 MG/1
CAPSULE, EXTENDED RELEASE ORAL
Qty: 90 CAPSULE | Refills: 3 | Status: SHIPPED | OUTPATIENT
Start: 2022-07-25

## 2022-07-25 RX ORDER — VENLAFAXINE HYDROCHLORIDE 75 MG/1
CAPSULE, EXTENDED RELEASE ORAL
Qty: 90 CAPSULE | Refills: 3 | Status: SHIPPED | OUTPATIENT
Start: 2022-07-25

## 2022-07-25 NOTE — PROGRESS NOTES
Problem List Items Addressed This Visit        Behavioral    Generalized Anxiety Disorder     Doing well with Venlafaxine 117.5mg daily. Refill provided today           Relevant Medications    venlafaxine (EFFEXOR XR) 37.5 MG 24 hr capsule    venlafaxine (EFFEXOR XR) 75 MG 24 hr capsule      Other Visit Diagnoses     Medial epicondylitis of elbow, left    -  Primary    Relevant Medications    nabumetone (RELAFEN) 500 MG tablet    Acute pain of right shoulder        Relevant Medications    nabumetone (RELAFEN) 500 MG tablet        We discussed modifying her activities in particular being careful not to break her wrist while doing Pilates.  We also talked about reducing the amount of weights she is lifting until her right shoulder feels better.  A prescription for Relafen was provided to take at bedtime for the next 10 days as she is having some difficulty sleeping due to the shoulder pain.  Consider physical therapy as next up if not improving.    Subjective   Keena Moran is a 52 year old who presents today with a chief complaint of left elbow pain.  He also like me to take her right shoulder and that has been bothering her recently.  The patient also needs a refill of her Effexor.  She reports pain in her left elbow around the medial aspect.  She feels that she has no limitation in her range of motion that does not cause any increase in pain in the elbow.  However, it has been persistently bothersome especially when she does certain activities.  She has been doing more Pilates recently and wonders if that could be a participating factor the patient also had shoulder surgery on her right shoulder and states that she has been having some discomfort involving that right shoulder more recently although it feels definitely different than the pain she had prior to her shoulder surgery.  Lastly, the patient has a cyst on the palmar aspect of her hand and wonders if she should see about that.      Chief Complaint    Patient presents with     Elbow Pain     Shoulder pain in right shoulder     cyst in hand      Elbow Pain    History of Present Illness       Reason for visit:  Joint pain in left elbow and residual pain in right shoulder (had surgery 7/21)  Symptom onset:  More than a month  Symptoms include:  Pain and weakness  Symptom intensity:  Moderate  Symptom progression:  Worsening  Had these symptoms before:  Yes  Has tried/received treatment for these symptoms:  Yes  What makes it worse:  No  What makes it better:  Pain medication - Ibuprofen    She eats 2-3 servings of fruits and vegetables daily.She consumes 0 sweetened beverage(s) daily.She exercises with enough effort to increase her heart rate 10 to 19 minutes per day.  She exercises with enough effort to increase her heart rate 4 days per week.   She is taking medications regularly.         Objective    /72 (BP Location: Left arm, Patient Position: Left side, Cuff Size: Adult Regular)   Pulse 85   SpO2 99%   There is no height or weight on file to calculate BMI.  Physical Exam   GENERAL: healthy, alert and no distress  LEFT ELBOW: normal ROM about elbow with tenderness to palpation over the medial epicondyle and pain with resistance to wrist flexion  RIGHT SHOULDER: mild tenderness over proximal superior deltoid. Normal ROM about shoulder without impingement sign.           This note has been dictated using voice recognition software. Any grammatical or context distortions are unintentional and inherent to the software

## 2022-08-31 ENCOUNTER — VIRTUAL VISIT (OUTPATIENT)
Dept: GASTROENTEROLOGY | Facility: CLINIC | Age: 52
End: 2022-08-31
Payer: COMMERCIAL

## 2022-08-31 DIAGNOSIS — K59.01 SLOW TRANSIT CONSTIPATION: Primary | ICD-10-CM

## 2022-08-31 PROCEDURE — 99214 OFFICE O/P EST MOD 30 MIN: CPT | Mod: GT | Performed by: PHYSICIAN ASSISTANT

## 2022-08-31 NOTE — PATIENT INSTRUCTIONS
It was a pleasure visiting with you today. Below is a summary of what we discussed.     - start with a bowel prep to cleanse the colon (miralax bowel prep below or at the end of this after visit summary).   - Continue miralax 17 grams daily   - switch to magnexium oxide. Start with 200mg and increase to 400mg daily.   - if still constipated increase miralax to twice daily dosing  - return to clinic as needed for any new, persistent or worsening symptoms.         MIRALAX BOWEL PREP   Start a clear liquid diet after breakfast.  A clear liquid diet consists of soda, juices without pulp, broth, Jell-O, Popsicles, Italian ice, hard candies (if age appropriate).  Pretty much anything you can see through!!  (NO dairy products or solid food.)    You will need:  32 or 64 oz. of flavored Pedialyte or Gatorade (See Below)  One 255 gram bottle of Miralax  2 or 3 bisacodyl (Dulcolax) tablets     Mix the entire container of Miralax (255 gr) in 64 oz. (or half a container in 32 ounces) of Pedialyte or  Gatorade. Leave this Miralax mixture in the refrigerator for one hour to help the Miralax dissolve, and to help the mixture taste better.  Note, the dose we re suggesting is for a bowel  cleanout.   It is not the dose that is written on the bottle, which is designed for daily softening of stool.  We need this higher dose so that the cleanout will work.    Drink 4-10 oz. of the MiraLax-electrolyte solution mixture every 15-20 minutes until 32 oz (1/2 the total amount, or 1 quart) is consumed.  It is very important to drink all 32 oz of the MiraLax/clear liquid mixture.   Within 30 min of finishing the MiraLax-electrolyte solution mixture, take the 2  bisacodyl (Dulcolax) tablets with 8-12 oz. of   Drink 8-12 oz. of the MiraLax-electrolyte solution mixture every 15-20 minutes until the entire 64 oz mixture is consumed.  It is very important to drink all 64 oz of the MiraLax-electrolyte solution.   Within 30 min of finishing the  MiraLax-electrolyte solution mixture, take the 3 bisacodyl (Dulcolax) tablets with 8-12 oz. of clear liquid (these tabs can be crushed).  (Note that the package instructions may direct not to take more than two tablets at a time, but for this preparation take three).

## 2022-08-31 NOTE — PROGRESS NOTES
Keena Moran is a 52 year old who is being evaluated via a billable video visit.      How would you like to obtain your AVS? MyChart  If the video visit is dropped, the invitation should be resent by: Send to e-mail at: [email or web address]  Will anyone else be joining your video visit? No          GASTROENTEROLOGY FOLLOW UP VIDEO VISIT     Video Start Time: 2:56 PM    CC/REFERRING MD:    Surekha Contreras      REASON FOR VISIT:  Follow Up          HISTORY OF PRESENT ILLNESS:    Keena Vee is 52 year old female who presents for follow up.  She was previously seen in February 2020 by Dr. Douglass for constipation.     She reports having troubles with constipation over the past 4 years.  She had a number of studies including colonoscopy, pelvic floor evaluation and sits marker studies.  She was ultimately diagnosed with slow transit constipation.  She was previously being followed by [facility] and had tried a number of constipation medications that were often too successful leading to diarrhea.  When she met with Dr. Douglass she was placed on a regimen that worked better for her which entailed MiraLAX and magnesium daily.  This regimen works for her, more recently.  She reports worsening symptoms of the past 6 months.  She has not been able to identify any specific trigger.  She did have a Mirena IUD placed recently but noticed a worsening constipation prior to that.  She currently endorses bloating and feeling abdominal distention and discomfort.  She is taking Gas-X and Beano with every meal to help.  She can easily go few days without having a bowel movement and is not feeling that she is emptying out well at this time.    Last colonoscopy in 2019 unremarkable.      Keena Moran  has a past medical history of Basal cell cancer (2009).    she  has a past surgical history that includes REMOVAL ADENOIDS,PRIMARY,<13 Y/O; REMOVAL OF TONSILS,<13 Y/O; hernia repair, umbilical; and Mohs micrographic  procedure (Right, 2009).    she  reports that she has never smoked. She has never used smokeless tobacco. She reports current alcohol use. She reports that she does not use drugs.    her family history includes Anxiety Disorder in her daughter, son, and son; Basal cell carcinoma in her father; Chronic Obstructive Pulmonary Disease in her mother; Congenital heart disease in her son; Coronary Artery Disease (age of onset: 40) in her maternal grandfather; Coronary Artery Disease (age of onset: 45) in her father; Coronary Artery Disease (age of onset: 60) in her paternal grandmother; Diabetes Type 2  in her brother; Heart Failure in her father; Hypertension in her father and mother; Lung Cancer (age of onset: 59) in her mother; Lung Cancer (age of onset: 70) in her maternal grandfather; Obesity in her mother; Skin Cancer in her mother.    ALLERGIES:  Latex        PERTINENT MEDICATIONS:    Current Outpatient Medications:      Biotin 5 MG TABS, , Disp: , Rfl:      Cholecalciferol 100 MCG (4000 UT) CAPS, Take 4,000 mg by mouth , Disp: , Rfl:      Ferrous Sulfate (IRON PO), , Disp: , Rfl:      polyethylene glycol (MIRALAX/GLYCOLAX) powder, Take 17 g (1 capful) by mouth daily, Disp: 507 g, Rfl: 11     traZODone (DESYREL) 50 MG tablet, Take 1 tablet (50 mg) by mouth At Bedtime, Disp: 90 tablet, Rfl: 3     venlafaxine (EFFEXOR XR) 37.5 MG 24 hr capsule, TAKE 1 CAPSULE BY MOUTH EVERY DAY, Disp: 90 capsule, Rfl: 3     venlafaxine (EFFEXOR XR) 75 MG 24 hr capsule, TAKE 1 CAPSULE BY MOUTH EVERY DAY, Disp: 90 capsule, Rfl: 3     nabumetone (RELAFEN) 500 MG tablet, Take 1 tablet (500 mg) by mouth 2 times daily, Disp: 10 tablet, Rfl: 0        PHYSICAL EXAMINATION:  Constitutional: aaox3, cooperative, pleasant, not dyspneic/diaphoretic, no acute distress      GENERAL: Healthy, alert and no distress  EYES: Eyes grossly normal to inspection.  No discharge or erythema, or obvious scleral/conjunctival abnormalities.  RESP: No audible  wheeze, cough, or visible cyanosis.  No visible retractions or increased work of breathing.    SKIN: Visible skin clear. No significant rash, abnormal pigmentation or lesions.  NEURO: Cranial nerves grossly intact.  Mentation and speech appropriate for age.  PSYCH: Mentation appears normal, affect normal/bright, judgement and insight intact, normal speech and appearance well-groomed.        ASSESSMENT/PLAN:     1. Slow transit constipation        Keena Vee is a 52 year old female who presents for follow-up of slow transit constipation.  Her work-up over the past few years includes colonoscopy, pelvic floor evaluation and sits marker studies.  She was ultimately diagnosed with slow transit constipation.  She has tried a number of remedies in the past but has found no success with MiraLAX 17 g daily and magnesium daily.  She notes worsening symptoms over the past 6 months.  We reviewed her symptoms and current bowel regimen.  Recommended that she continue with MiraLAX 17 g daily and switch her magnesium citrate pills to magnesium oxide.  Can start with 200 to 400 mg of magnesium oxide.  If still constipated advised that she should increase her MiraLAX to twice daily.      She is interested in forming a bowel cleanout this time as well.  Information regarding MiraLAX bowel cleanout provided on AVS.  Afterwards we will have her continue with the MiraLAX and magnesium oxide daily.     If these measures do not work then we may need to consider other options such as Linzess.    Return if symptoms worsen or fail to improve.      Thank you for this consultation.  It was a pleasure to participate in the care of this patient; please contact us with any further questions.        This note was created with voice recognition software, and while reviewed for accuracy, typos may remain.       35 minutes spent on the date of the encounter doing chart review, history and exam, documentation and further activities per the  note    Daryl Sesay PA-C  Gastroenterology  [facility]       Video-Visit Details    Type of service:  Video Visit    Video End Time:3:20 PM    Originating Location (pt. Location): Home    Distant Location (provider location):  [facility]     Platform used for Video Visit: [employer]

## 2022-09-01 ENCOUNTER — TRANSFERRED RECORDS (OUTPATIENT)
Dept: HEALTH INFORMATION MANAGEMENT | Facility: CLINIC | Age: 52
End: 2022-09-01

## 2022-09-18 ENCOUNTER — HEALTH MAINTENANCE LETTER (OUTPATIENT)
Age: 52
End: 2022-09-18

## 2022-10-09 ENCOUNTER — MYC MEDICAL ADVICE (OUTPATIENT)
Dept: FAMILY MEDICINE | Facility: CLINIC | Age: 52
End: 2022-10-09

## 2022-10-12 ENCOUNTER — OFFICE VISIT (OUTPATIENT)
Dept: FAMILY MEDICINE | Facility: CLINIC | Age: 52
End: 2022-10-12

## 2022-10-12 VITALS — HEART RATE: 92 BPM | DIASTOLIC BLOOD PRESSURE: 74 MMHG | OXYGEN SATURATION: 99 % | SYSTOLIC BLOOD PRESSURE: 128 MMHG

## 2022-10-12 DIAGNOSIS — K14.0 GLOSSITIS: Primary | ICD-10-CM

## 2022-10-12 DIAGNOSIS — K14.8 TONGUE THRUSTING: ICD-10-CM

## 2022-10-12 DIAGNOSIS — L82.0 INFLAMED SEBORRHEIC KERATOSIS: ICD-10-CM

## 2022-10-12 LAB
ERYTHROCYTE [DISTWIDTH] IN BLOOD BY AUTOMATED COUNT: 12 % (ref 10–15)
HCT VFR BLD AUTO: 39.1 % (ref 35–47)
HGB BLD-MCNC: 13.4 G/DL (ref 11.7–15.7)
MCH RBC QN AUTO: 30 PG (ref 26.5–33)
MCHC RBC AUTO-ENTMCNC: 34.3 G/DL (ref 31.5–36.5)
MCV RBC AUTO: 88 FL (ref 78–100)
PLATELET # BLD AUTO: 234 10E3/UL (ref 150–450)
RBC # BLD AUTO: 4.46 10E6/UL (ref 3.8–5.2)
TSH SERPL DL<=0.005 MIU/L-ACNC: 1.32 UIU/ML (ref 0.3–4.2)
VIT B12 SERPL-MCNC: 595 PG/ML (ref 232–1245)
WBC # BLD AUTO: 4.4 10E3/UL (ref 4–11)

## 2022-10-12 PROCEDURE — 84443 ASSAY THYROID STIM HORMONE: CPT | Performed by: FAMILY MEDICINE

## 2022-10-12 PROCEDURE — 36415 COLL VENOUS BLD VENIPUNCTURE: CPT | Performed by: FAMILY MEDICINE

## 2022-10-12 PROCEDURE — 90471 IMMUNIZATION ADMIN: CPT | Performed by: FAMILY MEDICINE

## 2022-10-12 PROCEDURE — 99213 OFFICE O/P EST LOW 20 MIN: CPT | Mod: 25 | Performed by: FAMILY MEDICINE

## 2022-10-12 PROCEDURE — 85027 COMPLETE CBC AUTOMATED: CPT | Performed by: FAMILY MEDICINE

## 2022-10-12 PROCEDURE — 82607 VITAMIN B-12: CPT | Performed by: FAMILY MEDICINE

## 2022-10-12 PROCEDURE — 90686 IIV4 VACC NO PRSV 0.5 ML IM: CPT | Performed by: FAMILY MEDICINE

## 2022-10-12 RX ORDER — METHYLDOPA/HYDROCHLOROTHIAZIDE 250MG-25MG
TABLET ORAL
COMMUNITY

## 2022-10-12 NOTE — PROGRESS NOTES
Problem List Items Addressed This Visit    None  Visit Diagnoses     Glossitis    -  Primary    Relevant Orders    CBC with platelets    Vitamin B12    TSH with free T4 reflex    Tongue thrusting        Relevant Orders    Occupational Therapy Referral    Inflamed seborrheic keratosis            The patient has resolving acute glossitis of unclear etiology.  I did check some labs today.  However, it seems to be resolving on its own so we will simply monitor for now.  I placed a referral to Occupational Therapy to see if there is help regarding her tongue thrusting concerns.  Lastly, she was reassured regarding the benign seborrheic keratosis on her left upper arm.    Subjective   Keena Moran is a 52 year old who presents today with a chief complaint of tongue swelling.  She reports that this started a few days ago.  She felt a slight discomfort involving her tongue when she went to bed 1 night, and then awoke the next morning with a feeling that her tongue was swollen and uncomfortable and it was to the extent that it even affected her speech.  She then took a look and could see scalloping around the edges of the tongue.  She cannot think of anything new from an oral care standpoint or anything new that she may have eaten.  She supplements B12 as well as iron on a regular basis.  She wonders what else it could be.  She does note that it has been getting quite a bit better over the last 24 to 48 hours.  The patient also wonders if it might be helpful to meet with an occupational therapist regarding tongue thrusting.  She states that she rather continuously pushes her tongue up against her front teeth during the day and this is something that she picked up during her very stressful divorce.  However, the bad habit has continued to the point where the tip of her tongue sometimes feels sore.  She has tried quitting this behavior on her own but has not been successful.  Lastly, she has a skin lesion that is  relatively new on her left upper arm she would like me to take a look at.      Chief Complaint   Patient presents with     Tongue Lesion(S)     Oral Swelling      History of Present Illness       Reason for visit:  Swollen and scalloped tongue  Symptom onset:  3-7 days ago  Symptom intensity:  Moderate  Symptom progression:  Staying the same  Had these symptoms before:  No  What makes it worse:  No  What makes it better:  No    She eats 4 or more servings of fruits and vegetables daily.She consumes 1 sweetened beverage(s) daily.She exercises with enough effort to increase her heart rate 20 to 29 minutes per day.  She exercises with enough effort to increase her heart rate 4 days per week.   She is taking medications regularly.             Objective    /74 (BP Location: Left arm, Patient Position: Left side, Cuff Size: Adult Small)   Pulse 92   SpO2 99%   There is no height or weight on file to calculate BMI.  Physical Exam   GENERAL: healthy, alert and no distress  HENT: ear canals and TM's normal, nose and mouth without ulcers or lesions  SKIN: well demarcated tan lesion about 4mm in size without irregular pigmentation.       This note has been dictated using voice recognition software. Any grammatical or context distortions are unintentional and inherent to the software

## 2022-10-13 DIAGNOSIS — K14.8 TONGUE THRUSTING: Primary | ICD-10-CM

## 2022-10-14 ENCOUNTER — TELEPHONE (OUTPATIENT)
Dept: FAMILY MEDICINE | Facility: CLINIC | Age: 52
End: 2022-10-14

## 2022-10-14 NOTE — TELEPHONE ENCOUNTER
----- Message from Surekha Contreras sent at 10/13/2022  3:03 PM CDT -----  Regarding: FW: Speech referral needed  Please let patient know that it is actually speech therapy that would help with this.  I placed a new referral and they should be reaching out.  ----- Message -----  From: Rhonda Villagomez  Sent: 10/13/2022   2:52 PM CDT  To: Surekha Contreras  Subject: Speech referral needed                           Damián Contreras,    We received an order for OT for this patient. Could you place a new speech referral for this patients tongue thrusting instead? Thank you.    Rhonda LOCKETT [facility] Sched[facility]

## 2022-10-27 ENCOUNTER — MYC MEDICAL ADVICE (OUTPATIENT)
Dept: FAMILY MEDICINE | Facility: CLINIC | Age: 52
End: 2022-10-27

## 2022-10-27 DIAGNOSIS — F41.1 GENERALIZED ANXIETY DISORDER: Primary | ICD-10-CM

## 2022-10-28 ASSESSMENT — PATIENT HEALTH QUESTIONNAIRE - PHQ9
8. MOVING OR SPEAKING SO SLOWLY THAT OTHER PEOPLE COULD HAVE NOTICED. OR THE OPPOSITE, BEING SO FIGETY OR RESTLESS THAT YOU HAVE BEEN MOVING AROUND A LOT MORE THAN USUAL: MORE THAN HALF THE DAYS
6. FEELING BAD ABOUT YOURSELF - OR THAT YOU ARE A FAILURE OR HAVE LET YOURSELF OR YOUR FAMILY DOWN: NEARLY EVERY DAY
5. POOR APPETITE OR OVEREATING: NEARLY EVERY DAY
SUM OF ALL RESPONSES TO PHQ QUESTIONS 1-9: 22
3. TROUBLE FALLING OR STAYING ASLEEP OR SLEEPING TOO MUCH: NOT AT ALL
2. FEELING DOWN, DEPRESSED OR HOPELESS: NEARLY EVERY DAY
SUM OF ALL RESPONSES TO PHQ9 QUESTIONS 1 & 2: 6
7. TROUBLE CONCENTRATING ON THINGS, SUCH AS READING THE NEWSPAPER OR WATCHING TELEVISION: NEARLY EVERY DAY
1. LITTLE INTEREST OR PLEASURE IN DOING THINGS: NEARLY EVERY DAY
9. THOUGHTS THAT YOU WOULD BE BETTER OFF DEAD, OR OF HURTING YOURSELF: MORE THAN HALF THE DAYS
4. FEELING TIRED OR HAVING LITTLE ENERGY: NEARLY EVERY DAY

## 2022-10-28 ASSESSMENT — ANXIETY QUESTIONNAIRES
7. FEELING AFRAID AS IF SOMETHING AWFUL MIGHT HAPPEN: NOT AT ALL
4. TROUBLE RELAXING: SEVERAL DAYS
3. WORRYING TOO MUCH ABOUT DIFFERENT THINGS: SEVERAL DAYS
6. BECOMING EASILY ANNOYED OR IRRITABLE: NEARLY EVERY DAY
5. BEING SO RESTLESS THAT IT IS HARD TO SIT STILL: MORE THAN HALF THE DAYS
2. NOT BEING ABLE TO STOP OR CONTROL WORRYING: NEARLY EVERY DAY
GAD7 TOTAL SCORE: 12
1. FEELING NERVOUS, ANXIOUS, OR ON EDGE: MORE THAN HALF THE DAYS

## 2022-10-28 NOTE — TELEPHONE ENCOUNTER
Spoke with patient regarding mychart message. Performed PHQ-9 and CRISTHIAN-7, please scores in flowsheet.     She denies any active thoughts or intent of hurting herself and has no plans to hurt herself.     She wants to know if she can increase her Effexor dose currently taking 75mg and 37.5mg capsules. She stated she has other health conditions she is dealing with so does not prefer to change medications unless completely necessary.    Offered a sameday appointment with covering provider, patient adamant that she specifically wants to have Dr. Contreras discuss this with her. She was made aware that PCP is out of office until Monday. Stated she will wait for her response.     Last OV 10/12/22, upcoming 12/21/22. Please advise

## 2022-11-04 RX ORDER — VENLAFAXINE HYDROCHLORIDE 150 MG/1
150 CAPSULE, EXTENDED RELEASE ORAL DAILY
Qty: 90 CAPSULE | Refills: 1 | Status: SHIPPED | OUTPATIENT
Start: 2022-11-04

## 2022-11-08 ENCOUNTER — TRANSFERRED RECORDS (OUTPATIENT)
Dept: HEALTH INFORMATION MANAGEMENT | Facility: CLINIC | Age: 52
End: 2022-11-08

## 2023-10-08 ENCOUNTER — HEALTH MAINTENANCE LETTER (OUTPATIENT)
Age: 53
End: 2023-10-08

## 2023-11-30 DIAGNOSIS — F41.1 GENERALIZED ANXIETY DISORDER: ICD-10-CM

## 2023-12-01 RX ORDER — VENLAFAXINE HYDROCHLORIDE 37.5 MG/1
CAPSULE, EXTENDED RELEASE ORAL
Qty: 90 CAPSULE | Refills: 3 | OUTPATIENT
Start: 2023-12-01

## 2024-07-14 ENCOUNTER — HEALTH MAINTENANCE LETTER (OUTPATIENT)
Age: 54
End: 2024-07-14

## 2024-09-22 ENCOUNTER — HEALTH MAINTENANCE LETTER (OUTPATIENT)
Age: 54
End: 2024-09-22

## 2025-07-17 ENCOUNTER — PATIENT OUTREACH (OUTPATIENT)
Dept: CARE COORDINATION | Facility: CLINIC | Age: 55
End: 2025-07-17